# Patient Record
Sex: MALE | Race: BLACK OR AFRICAN AMERICAN | NOT HISPANIC OR LATINO | URBAN - METROPOLITAN AREA
[De-identification: names, ages, dates, MRNs, and addresses within clinical notes are randomized per-mention and may not be internally consistent; named-entity substitution may affect disease eponyms.]

---

## 2017-01-12 ENCOUNTER — HOSPITAL ENCOUNTER (INPATIENT)
Facility: HOSPITAL | Age: 27
LOS: 8 days | Discharge: HOME | DRG: 885 | End: 2017-01-20
Attending: PSYCHIATRY & NEUROLOGY | Admitting: PSYCHIATRY & NEUROLOGY
Payer: MEDICAID

## 2017-01-12 DIAGNOSIS — F25.0 SCHIZOAFFECTIVE DISORDER, BIPOLAR TYPE: ICD-10-CM

## 2017-01-12 DIAGNOSIS — F29 PSYCHOSIS: ICD-10-CM

## 2017-01-12 DIAGNOSIS — F29 PSYCHOSIS, UNSPECIFIED PSYCHOSIS TYPE: Primary | ICD-10-CM

## 2017-01-12 PROCEDURE — 11400000 HC PSYCH PRIVATE ROOM

## 2017-01-12 PROCEDURE — 27000339 *HC DAILY SUPPLY KIT

## 2017-01-12 RX ORDER — ACETAMINOPHEN 325 MG/1
650 TABLET ORAL EVERY 6 HOURS PRN
Status: DISCONTINUED | OUTPATIENT
Start: 2017-01-12 | End: 2017-01-20 | Stop reason: HOSPADM

## 2017-01-12 RX ORDER — OLANZAPINE 10 MG/1
10 TABLET ORAL EVERY 4 HOURS PRN
Status: DISCONTINUED | OUTPATIENT
Start: 2017-01-12 | End: 2017-01-20 | Stop reason: HOSPADM

## 2017-01-12 RX ORDER — DOCUSATE SODIUM 100 MG/1
100 CAPSULE, LIQUID FILLED ORAL DAILY PRN
Status: DISCONTINUED | OUTPATIENT
Start: 2017-01-12 | End: 2017-01-20 | Stop reason: HOSPADM

## 2017-01-12 RX ORDER — IBUPROFEN 200 MG
1 TABLET ORAL DAILY PRN
Status: DISCONTINUED | OUTPATIENT
Start: 2017-01-12 | End: 2017-01-20 | Stop reason: HOSPADM

## 2017-01-12 RX ORDER — LOPERAMIDE HYDROCHLORIDE 2 MG/1
2 CAPSULE ORAL
Status: DISCONTINUED | OUTPATIENT
Start: 2017-01-12 | End: 2017-01-20 | Stop reason: HOSPADM

## 2017-01-12 RX ORDER — HYDROXYZINE PAMOATE 50 MG/1
50 CAPSULE ORAL EVERY 6 HOURS PRN
Status: DISCONTINUED | OUTPATIENT
Start: 2017-01-12 | End: 2017-01-20 | Stop reason: HOSPADM

## 2017-01-12 RX ORDER — MAG HYDROX/ALUMINUM HYD/SIMETH 200-200-20
30 SUSPENSION, ORAL (FINAL DOSE FORM) ORAL EVERY 6 HOURS PRN
Status: DISCONTINUED | OUTPATIENT
Start: 2017-01-12 | End: 2017-01-20 | Stop reason: HOSPADM

## 2017-01-12 RX ORDER — OLANZAPINE 10 MG/2ML
10 INJECTION, POWDER, FOR SOLUTION INTRAMUSCULAR EVERY 4 HOURS PRN
Status: DISCONTINUED | OUTPATIENT
Start: 2017-01-12 | End: 2017-01-20 | Stop reason: HOSPADM

## 2017-01-12 NOTE — PLAN OF CARE
Problem: Overarching Goals (Adult)  Goal: Develops/Participates in Therapeutic [place] to Support Successful Transition  Outcome: Ongoing (interventions implemented as appropriate)  Group Note     Behavior:  Patient attended Psychotherapy Group and participated. Patient presents with calmer more pleasant mood.      Intervention:  How do you Feel About Yourself?  Patients completed assesement of their level of self esteem. Processed questions with group and discussed positive steps to improve.     Response:  Patient unable to stay for group or participate well as he was falling asleep. Patient eventually returned to his room.        Plan:  Will continue to encourage patient participation in group. Will meet 1:1 with patient later.

## 2017-01-12 NOTE — IP AVS SNAPSHOT
[address] [address]  Phone: [phone]           Patient Discharge Instructions     Our goal is to set you up for success. This packet includes information on your condition, medications, and your home care. It will help you to care for yourself so you don't get sicker and need to go back to the hospital.     Please ask your nurse if you have any questions.        There are many details to remember when preparing to leave the hospital. Here is what you will need to do:    1. Take your medicine. If you are prescribed medications, review your Medication List in the following pages. You may have new medications to  at the pharmacy and others that you'll need to stop taking. Review the instructions for how and when to take your medications. Talk with your doctor or nurses if you are unsure of what to do.     2. Go to your follow-up appointments. Specific follow-up information is listed in the following pages. Your may be contacted by a transition nurse or clinical provider about future appointments. Be sure we have all of the phone numbers to reach you, if needed. Please contact your provider's office if you are unable to make an appointment.     3. Watch for warning signs. Your doctor or nurse will give you detailed warning signs to watch for and when to call for assistance. These instructions may also include educational information about your condition. If you experience any of warning signs to your health, call your doctor.               [facility]  Unless otherwise directed by your provider, please contact [facility], our nurse care line that is available for 24/7 assistance.     [phone] (toll-free)    Registered nurses in the [facility] provide clinical advisement, health education, appointment booking, and other advisory services.                    ** Verify the list of medication(s) below is accurate and up to date. Carry  this with you in case of emergency. If your medications have changed, please notify your healthcare provider.             Medication List      START taking these medications        Additional Info                      risperidone 3 MG Tab   Commonly known as:  RISPERDAL   Quantity:  60 tablet   Refills:  1   Dose:  3 mg    Last time this was given:  3 mg on 1/20/2017  8:02 AM   Instructions:  Take 1 tablet (3 mg total) by mouth 2 (two) times daily.     Begin Date    AM    Noon    PM    Bedtime            Where to Get Your Medications      You can get these medications from any pharmacy     Bring a paper prescription for each of these medications     risperidone 3 MG Tab                  Please bring to all follow up appointments:    1. A copy of your discharge instructions.  2. All medicines you are currently taking in their original bottles.  3. Identification and insurance card.    Please arrive 15 minutes ahead of scheduled appointment time.    Please call 24 hours in advance if you must reschedule your appointment and/or time.        Follow-up Information     Follow up with [facility]. Go on 1/26/2017.    Specialties:  Behavioral Health, Psychiatry, Psychology    Why:  Outpatient Psych Services, as scheduled at     Contact information:    [address] [place] [address] [phone]          Schedule an appointment as soon as possible for a visit with [facility].    Why:  [employer]    Contact information:    [address] [place] [address] [phone]          Discharge Instructions       Patient educated on medication changes and discharge plan with patient verbalized understanding. Patient agrees to comply to discharge plan. Patient encouraged to follow up with primary care provider. No signs of distress or complaints. Consent obtain to fax information for follow-up visit.      Discharge References/Attachments      "SCHIZO-AFFECTIVE DISORDER (ENGLISH)    BIPOLAR DISORDER (ENGLISH)        Admission Information     Date & Time Provider Department CSN    1/12/2017  1:10 PM Mendez Cabrales MD [facility] [id number]       Admisson Diagnosis: Psychosis      Care Providers     Provider Role Specialty Primary office phone    Mendez Cabrales MD Attending Provider Psychiatry [phone]      Your Vitals Were     BP Pulse Temp Resp Height Weight    130/61 (BP Location: Right arm, Patient Position: Sitting, BP Method: Automatic) 93 98 °F (36.7 °C) (Oral) 18 5' 11" (1.803 m) 66.2 kg (146 lb)    BMI                20.36 kg/m2          Recent Lab Values     No lab values to display.      Blood Glucose and Lipid Panel Labs     No lab values to display.      Allergies as of 1/20/2017     No Known Allergies      Advance Directives     An advance directive is a document which, in the event you are no longer able to make decisions for yourself, tells your healthcare team what kind of treatment you do or do not want to receive, or who you would like to make those decisions for you.  If you do not currently have an advance directive, [facility] encourages you to create one.  For more information call:  [phone]), [phone] ([phone]),  or log on to [email or web address].        Advance Directive Status          Most Recent Value    Advance Directive Status  Patient does not have Advance Directive, declines information.      Smoking Cessation     If you would like to quit smoking:   You may be eligible for free services if you are a [place] resident and started smoking cigarettes before September 1, 1988.  Call the Smoking Cessation Trust (SCT) toll free at (134) 376-5054 or (262) 722-7917.   Call 9-800-QUIT-NOW if you do not meet the above criteria.            Language Assistance Services     ATTENTION: Language assistance services are available, free of charge. Please call [phone].  "     ATENCIÓN: Si habla chelo, tiene a faustin disposición servicios gratuitos de asistencia lingüística. Juan Ramon al [phone].     [facility] Ý: N?u b?n nói Ti?ng Vi?t, có các d?ch v? h? tr? ngôn ng? mi?n phí dành cho b?n. G?i s? [phone].        National Suicide Prevention Lifeline     If you or someone you know is thinking about suicide, call the National Suicide Prevention Lifeline.  [place] Suicide Hotline: 7-817-441-TALK (6452)  The lifeline is free, confidential and always available.  Help a loved one, a friend or yourself.  www.suicideprevenetionlifeline.org          [facility] Sign-Up     Activating your [facility] account is as easy as 1-2-3!     1) Visit [email or web address], select Sign Up Now, enter this activation code and your date of birth, then select Next.  [email or web address]  Expires: 3/5/2017  6:20 PM      2) Create a username and password to use when you visit [facility] in the future and select a security question in case you lose your password and select Next.    3) Enter your e-mail address and click Sign Up!    Additional Information  If you have questions, please e-mail [email or web address] or call [phone] to talk to our [facility] staff. Remember, [facility] is NOT to be used for urgent needs. For medical emergencies, dial 911.          [facility] complies with applicable Federal civil rights laws and does not discriminate on the basis of race, color, national origin, age, disability, or sex.

## 2017-01-12 NOTE — CONSULTS
IP consult to dietary  Consult performed by: SABRINA MEJIA  Consult ordered by: JOHNATHAN OGDEN  Assessment/Recommendations: BELLA Mejia Registered Dietitian Signed Nutrition Progress Notes         Hide copied text  Hover for attribution information      01/12/17 0389  Reason for Assessment  Reason for Assessment physician consult  Diagnosis (physcosis)  Relevent Medical History psych  Level of Care Low/Medium: 1-2  Nutrition Risk Screen  Nutrition Risk Screen no indicators present  Nutrition/Diet History  Meal/Snack Patterns pt will receive 3 meals daily and snacks on unit  Use of Vitamin/Mineral/Herbal Supplements pt on MVI while on unit  Functional Status ambulatory  Food Allergies (none)  Factors Affecting Nutritional Intake behavioral (mealtime)  Nutrition Support Formula Prior to Admit (n/a)  Anthropometrics  Height (cm) 180.3 cm  Height (inches) 70.98 in  Weight (kg) 65.3 kg  Weight (lb) 143.96 lb  Ideal Body Weight (IBW), Male 171.88 lb  % Ideal Body Weight, Male (lb) 83.76 lb  Adjusted Body Weight 68.5 kg (151 lb)  BMI (kg/m2) 20.09  BMI Grade 18.5-24.9 - normal  Labs/Tests/Procedures/Meds  Pertinent Labs Reviewed reviewed  Pertinent Labs Comments no labs available yet  Pertinent Medications Reviewed reviewed  Physical Findings/Assessment  Tubes (no)  Oral/Mouth Cavity (pt eating 1-00%)  Estimated/Assessed Needs  Weight Used For Calorie Calculations 68.5 kg (151 lb)  Energy Calorie Reqirements (kcal) 2000  Energy Need Method indirect calorimetry  Indirect Calorimetry x 30  30 kcal/kg (kcal) 2054.79  Protein Requirements 68 gm  Weight Used For Protein Calculations 68.5 kg (151 lb)  1.0 gm Protein (gm) 68.64  RDA Method (mL) 2000  Nutrition Prescription Ordered  Current Diet Order general  Nutrition Order Comments adequate  Current Nutrition Support Formula Ordered (n/a)  Nutrition Risk  Level of Risk low  Continuum of Care Plan  Referral to Outpatient Services behavioral health clinic  Malnutrition  (Undernutrition) Diagnosis  % Intake of Estimated Energy Needs 75 - 100%  % Meal Intake 100%  Malnutrition Reason illness related  Nutrition Diagnosis  Nutrition Problem Underweight  Etiology/Related To see dx  Nutrition Diagnosis Signs/Symptoms As Evidenced By see BMI  Nutrition Diagnosis Status Continues  Additional Nutrition Diagnosis? no  Recommendations  Recommendation/Intervention 2 Boosts daily  Goals to maintain or increase body wt  Nutrition Goal Status progressing towards goal  Communication of RD Recs (refer to notes)  Monitor and Evaluation  Food and Nutrient Intake food and beverage intake  Anthropometric Measurements weight change  Nutrition Follow-up  RD Follow-up? Yes  Next Date to be Seen by RD 01/16/17

## 2017-01-12 NOTE — PLAN OF CARE
Problem: Patient Care Overview (Adult)  Goal: Plan of Care Review  Outcome: Ongoing (interventions implemented as appropriate)  Admit note : nursing assessment done . Patient admits to the delusions given in report . He is cooperative and asks for understanding from us . States that he is a lovable person . Says he is unable to sleep because of all of his unpleasant thoughts . Unit tour done . He is cooperative .

## 2017-01-12 NOTE — PROGRESS NOTES
01/12/17 1554   Reason for Assessment   Reason for Assessment physician consult   Diagnosis (physcosis)   Relevent Medical History psych   Level of Care Low/Medium: 1-2   Nutrition Risk Screen   Nutrition Risk Screen no indicators present   Nutrition/Diet History   Meal/Snack Patterns pt will receive 3 meals daily and snacks on unit   Use of Vitamin/Mineral/Herbal Supplements pt on MVI while on unit   Functional Status ambulatory   Food Allergies (none)   Factors Affecting Nutritional Intake behavioral (mealtime)   Nutrition Support Formula Prior to Admit (n/a)   Anthropometrics   Height (cm) 180.3 cm   Height (inches) 70.98 in   Weight (kg) 65.3 kg   Weight (lb) 143.96 lb   Ideal Body Weight (IBW), Male 171.88 lb   % Ideal Body Weight, Male (lb) 83.76 lb   Adjusted Body Weight 68.5 kg (151 lb)   BMI (kg/m2) 20.09   BMI Grade 18.5-24.9 - normal   Labs/Tests/Procedures/Meds   Pertinent Labs Reviewed reviewed   Pertinent Labs Comments no labs available yet   Pertinent Medications Reviewed reviewed   Physical Findings/Assessment   Tubes (no)   Oral/Mouth Cavity (pt eating 1-00%)   Estimated/Assessed Needs   Weight Used For Calorie Calculations 68.5 kg (151 lb)   Energy Calorie Reqirements (kcal) 2000   Energy Need Method indirect calorimetry   Indirect Calorimetry x 30   30 kcal/kg (kcal) 2054.79   Protein Requirements 68 gm   Weight Used For Protein Calculations 68.5 kg (151 lb)   1.0 gm Protein (gm) 68.64   RDA Method (mL) 2000   Nutrition Prescription Ordered   Current Diet Order general   Nutrition Order Comments adequate   Current Nutrition Support Formula Ordered (n/a)   Nutrition Risk   Level of Risk low   Continuum of Care Plan   Referral to Outpatient Services behavioral health clinic   Malnutrition (Undernutrition) Diagnosis   % Intake of Estimated Energy Needs 75 - 100%   % Meal Intake 100%   Malnutrition Reason illness related   Nutrition Diagnosis   Nutrition Problem Underweight   Etiology/Related To see  dx   Nutrition Diagnosis Signs/Symptoms As Evidenced By see BMI   Nutrition Diagnosis Status Continues   Additional Nutrition Diagnosis? no   Recommendations   Recommendation/Intervention 2 Boosts daily   Goals to maintain or increase body wt   Nutrition Goal Status progressing towards goal   Communication of RD Recs (refer to notes)   Monitor and Evaluation   Food and Nutrient Intake food and beverage intake   Anthropometric Measurements weight change   Nutrition Follow-up   RD Follow-up? Yes   Next Date to be Seen by RD 01/16/17

## 2017-01-12 NOTE — PLAN OF CARE
Problem: Patient Care Overview (Adult)  Goal: Plan of Care Review  Outcome: Ongoing (interventions implemented as appropriate)  Recommendations  Recommendation/Intervention 2 Boosts daily  Goals to maintain or increase body wt  Nutrition Goal Status progressing towards goal  Communication of RD Recs (refer to notes)
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
## 2017-01-13 PROBLEM — F25.0 SCHIZOAFFECTIVE DISORDER, BIPOLAR TYPE: Status: ACTIVE | Noted: 2017-01-12

## 2017-01-13 PROCEDURE — 90833 PSYTX W PT W E/M 30 MIN: CPT | Mod: AF,S$PBB,, | Performed by: PSYCHIATRY & NEUROLOGY

## 2017-01-13 PROCEDURE — 11400000 HC PSYCH PRIVATE ROOM

## 2017-01-13 PROCEDURE — 27000339 *HC DAILY SUPPLY KIT

## 2017-01-13 PROCEDURE — 99223 1ST HOSP IP/OBS HIGH 75: CPT | Mod: AF,S$PBB,, | Performed by: PSYCHIATRY & NEUROLOGY

## 2017-01-13 PROCEDURE — 25000003 PHARM REV CODE 250: Performed by: PSYCHIATRY & NEUROLOGY

## 2017-01-13 PROCEDURE — 99223 1ST HOSP IP/OBS HIGH 75: CPT | Mod: ,,, | Performed by: NURSE PRACTITIONER

## 2017-01-13 RX ORDER — RISPERIDONE 0.5 MG/1
1 TABLET ORAL 2 TIMES DAILY
Status: DISCONTINUED | OUTPATIENT
Start: 2017-01-13 | End: 2017-01-14

## 2017-01-13 RX ADMIN — RISPERIDONE 1 MG: 0.5 TABLET, FILM COATED ORAL at 08:01

## 2017-01-13 RX ADMIN — RISPERIDONE 1 MG: 0.5 TABLET, FILM COATED ORAL at 11:01

## 2017-01-13 RX ADMIN — THERA TABS 1 TABLET: TAB at 08:01
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
## 2017-01-13 NOTE — PROGRESS NOTES
"Therapeutic Recreation Assessment Summary: Met with the patient to assess and develop a plan of care. Patient was talkative, speech elevated and has disorganized thought process. Patient states his mood was "ok." Patient states his reason for admit "I told my mom I'd take my medication later. My mom called the  on me." Patient states he feels "Overwhelmed by certain systems of stress in my lifetime." He states " I kind of over visualize sometimes. I imagine I see people I hadn't seen in a while." Patient verbalize enjoyment from playing basketball, telling jokes, writing poems and drawing. Patient admits to negative leisure lifestyle of marijuana use, smoking cigarettes and drinking alcohol occasionally. Patient identifies leisure barriers due to a lack of finances and unemployment. Patient reports that he is single, has some technical college and lives with his mother in [place]. Patient identified main goal "I want to be able to know when to ask a person a question, when to communicate and they not get upset." Patient verbalizes willingness to participate and become active in his treatment.  "
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
## 2017-01-13 NOTE — PLAN OF CARE
"Problem: Patient Care Overview (Adult)  Goal: Plan of Care Review  Outcome: Ongoing (interventions implemented as appropriate)  Patient cooperative. Grandiose. Rambling speech. Disorganized thoughts. Delusions about roaches. Medication compliant. Accepted breakfast, refusing lunch stating "I just want a lemon, that'll hold me over till I leave here next week". Accepting snacks.      "

## 2017-01-13 NOTE — SUBJECTIVE & OBJECTIVE
Past Medical History   Diagnosis Date    Anxiety     Depression     History of psychiatric hospitalization     Hx of psychiatric care     Psychiatric problem     Psychosis     Schizoaffective disorder     Therapy        No past surgical history on file.    Review of patient's allergies indicates:  No Known Allergies    No current facility-administered medications on file prior to encounter.      No current outpatient prescriptions on file prior to encounter.     Family History     None        Social History Main Topics    Smoking status: Heavy Tobacco Smoker     Packs/day: 1.00     Years: 0.50     Types: Cigarettes    Smokeless tobacco: Not on file    Alcohol use 1.2 oz/week     2 Cans of beer per week    Drug use: Yes     Special: Marijuana    Sexual activity: Not Currently     Partners: Female     Birth control/ protection: None     Review of Systems   Constitutional: Negative for chills and fever.   Respiratory: Negative for chest tightness and shortness of breath.    Cardiovascular: Negative for chest pain and palpitations.   Gastrointestinal: Negative for abdominal distention, abdominal pain, blood in stool and vomiting.   Genitourinary: Negative for dysuria, flank pain, hematuria and urgency.   Musculoskeletal: Negative for back pain and neck pain.   Skin: Negative for rash and wound.   Neurological: Negative for dizziness, weakness and numbness.   Hematological: Does not bruise/bleed easily.   Psychiatric/Behavioral: Positive for self-injury. Negative for agitation and suicidal ideas. The patient is not nervous/anxious.      Objective:     Vital Signs (Most Recent):  Temp: 97 °F (36.1 °C) (01/13/17 0730)  Pulse: (!) 57 (01/13/17 0730)  Resp: 16 (01/13/17 0730)  BP: 119/67 (01/13/17 0730) Vital Signs (24h Range):  Temp:  [96.7 °F (35.9 °C)-97.9 °F (36.6 °C)] 97 °F (36.1 °C)  Pulse:  [51-78] 57  Resp:  [16-18] 16  BP: (119-129)/(67-75) 119/67     Weight: 65.3 kg (144 lb)  Body mass index is 20.08  kg/(m^2).    Physical Exam   Constitutional: He is oriented to person, place, and time. He appears well-developed and well-nourished.   HENT:   Head: Normocephalic and atraumatic.   Neck: Normal range of motion. Neck supple. No thyromegaly present.   Cardiovascular: Normal rate, regular rhythm, normal heart sounds and intact distal pulses.    No murmur heard.  Pulmonary/Chest: Effort normal and breath sounds normal. No respiratory distress. He has no wheezes. He has no rales.   Abdominal: Soft. Bowel sounds are normal. He exhibits no distension. There is no tenderness.   Musculoskeletal: Normal range of motion. He exhibits no edema or deformity.   Neurological: He is alert and oriented to person, place, and time.   Neuro: Cranial nerves:  CN II Visual fields full to confrontation.   CN III, IV, VI Pupils are equal, round, and reactive to light.  CN III: no palsy  Nystagmus: none   Diplopia: none  Ophthalmoparesis: none  CN V Facial sensation intact.   CN VII Facial expression full, symmetric.   CN VIII normal.   CN IX normal.   CN X normal.   CN XI normal.   CN XII normal.     Skin: Skin is warm and dry.   Psychiatric: He has a normal mood and affect. His behavior is normal. Thought content normal.   Nursing note and vitals reviewed.      Significant Labs: All pertinent labs within the past 24 hours have been reviewed.
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
## 2017-01-13 NOTE — H&P
"PSYCHIATRY INPATIENT ADMISSION NOTE - H & P      1/13/2017 9:29 AM   Brian Aaron   1990   [id number]           DATE OF ADMISSION: 1/12/2017  1:10 PM    SITE: [facility]    CURRENT LEGAL STATUS: PEC and/or CEC      HISTORY    CHIEF COMPLAINT   Brian Aaron is a 26 y.o. male with a past psychiatric history of psychosis, currently admitted to the inpatient unit with the following chief complaint: psychosis    HPI   (Elements: Location, Quality, Severity, Duration, Timing, Content, Modifying Factors, Associated Signs & Symptoms)    The patient was seen and examined. The chart was reviewed.    The patient presented to the ER on 1/12/17 under an OPC with complaints of bizarre behavior. The patient was medically cleared and admitted to the U.     The patient has been experiencing AVH (chronic condition) with recent worsening of symptoms (unknowntime frame). He stated that he has been having thoughts of hurting things that hurt him like dogs, spiders and roaches. The roaches ars 4-5 feet long and people put their spirits in them; the wires outside give them electrical energy and they try to attack him . He tells of a dream about a dog on the couch, and "I thought he was dying so I hit him and he got up ,burped, ,farted, and jumped off the couch." He has recently been non-compliant with his medication and abuses cannabis.      He has significant and impairing s/s of psychosis with bizarre delusions, AVH, disorganized thought process and negative symptoms.     He was a poor historian and the history provided below was limited.    Denied Symptoms of Depression: no diminished mood or loss of interest/anhedonia; no irritability, diminished energy, change in sleep, change in appetite, diminished concentration or cognition or indecisiveness, PMA/R, excessive guilt or hopelessness or worthlessness, or suicidal ideations    +Changes in Sleep: +trouble with initiation/maintenance, no early morning awakening with " inability to return to sleep, no hypersomnolence    Denied Suicidal/Homicidal ideations: no active/passive ideations, organized plans,or future intentions    +Symptoms of psychosis: +hallucinations, +delusions, +disorganized thinking, no disorganized behavior or abnormal motor behavior, and +negative symptoms (+diminshed emotional expression, no avolition, no anhedonia, no alogia, no asociality)     +Symptoms of herrera or hypomania: no elevated, +expansive, no  irritable mood with +increased energy or activity; with +inflated self-esteem or grandiosity, +decreased need for sleep, +increased rate of speech, +FOI or racing thoughts, +distractibility, increased goal directed activity or PMA, no risky/disinhibited behavior    Denied Symptoms of TERESITA: no excessive anxiety/worry/fear; with no restlessness, fatigue, poor concentration, irritability, muscle tension, or sleep disturbance    Denied Symptoms of Panic Disorder: no recurrent panic attacks; without agoraphobia    Denied Symptoms of PTSD: no h/o trauma; no re-experiencing/intrusive symptoms, avoidant behavior, negative alterations in cognition or mood, or hyperarousal symptoms; without dissociative symptoms     Denied Symptoms of OCD: denied obsessions or compulsions     Denied Symptoms of Eating Disorders: no anorexia, bulimia or binging    +Substance Use: +intoxication, no withdrawal, no tolerance, no used in larger amounts or duration than intended, no unsuccessful attempts to limit or quit, no increased time engaging in or seeking out, +cravings or strong desire to use, no failure to fulfill obligations, no negative consequences in social/interpersonal/occupational,/recreational areas, no use in dangerous situations, +medical or psychological consequences       PSYCHOTHERAPY ADD-ON +[id number]   30 (16-37*) minutes    Time: 16 minutes  Participants: Met with patient    Therapeutic Intervention Type: behavior modifying psychotherapy, supportive psychotherapy  Why  "chosen therapy is appropriate versus another modality: relevant to diagnosis, patient responds to this modality, evidence based practice    Target symptoms: mood disorder, psychosis  Primary focus: psychosis  Psychotherapeutic techniques: supportive techniques; psycho-education    Outcome monitoring methods: self-report, observation    Patient's response to intervention:  The patient's response to intervention is accepting.    Progress toward goals:  The patient's progress toward goals is limited.            PAST PSYCHIATRIC HISTORY  Previous Psychiatric Hospitalizations: multiple admission in psychiatry units; 3 previous admits; first was at age [age]; second was in 12/2016.   Previous SI/HI: SI once; no HI  Previous Suicide Attempts: denied   Previous Medication Trials: Geodon; Risperdal and many others, "but they hurt me."   Psychiatric Care (current & past): diagnosed with schizophrenia.  History of Psychotherapy: as per schizophrenic dx  History of Violence: possible        SUBSTANCE ABUSE HISTORY   Tobacco: 0.25-1 ppd x 9 years  Alcohol: wine infrequently;   Illicit Substances: patient used ecstasy in the past; cannabis weekly to Monthly  Misuse of Prescription Medications: history of sleep medication abuse.  Detoxes: none  Rehabs: none  12 Step Meetings: none  Periods of Sobriety: none  Withdrawal: none        PAST MEDICAL & SURGICAL HISTORY   Past Medical History   Diagnosis Date    Anxiety     Depression     History of psychiatric hospitalization     Hx of psychiatric care     Psychiatric problem     Psychosis     Schizoaffective disorder     Therapy      No past surgical history on file.      CURRENT MEDICATION REGIMEN   Home Meds:   Prior to Admission medications    Not on File         OTC Meds: none    Scheduled Meds:    multivitamin  1 tablet Oral Daily      PRN Meds: acetaminophen, aluminum-magnesium hydroxide-simethicone, docusate sodium, hydrOXYzine pamoate, loperamide, nicotine, olanzapine " "**AND** olanzapine   Psychotherapeutics     Start     Stop Route Frequency Ordered    01/12/17 1634  olanzapine tablet 10 mg  (Olanzapine)      -- Oral Every 4 hours PRN 01/12/17 1535    01/12/17 1634  olanzapine injection 10 mg  (Olanzapine)      -- IM Every 4 hours PRN 01/12/17 1535            ALLERGIES   Review of patient's allergies indicates:  No Known Allergies      NEUROLOGIC HISTORY  Seizures: denied   Head trauma: denied       FAMILY PSYCHIATRIC HISTORY   History reviewed. No pertinent family history.           SOCIAL HISTORY  Developmental/Childhood: met milestones  History of Physical/Sexual Abuse: denied, "it was more like mental abuse"  Education: some college    Employment: unemployed   Financial: supported by family   Relationship Status/Sexual Orientation: single, never    Children: 1 son (not in the patient's life)  Housing Status: lives with his mother    Advent: Anabaptist, Christianity   History: denied   Recreational Activities: basketball, video games  Access to Gun: denied       LEGAL HISTORY   Past Charges/Incarcerations: 8/2001 and a few other incarcerations-  "false accusations"   Pending Charges: denied      ROS  Reviewed note/exam by ER physician in paper chart; FM consult pending         EXAMINATION      PHYSICAL EXAM  Reviewed note/exam by ER physician in paper chart; FM consult pending     VITALS   Vitals:    01/13/17 0730   BP: 119/67   Pulse: (!) 57   Resp: 16   Temp: 97 °F (36.1 °C)          PAIN  0/10  Subjective report of pain matches objective signs and symptoms: Yes      LABORATORY DATA   No results found for this or any previous visit (from the past 72 hour(s)).   No results found for: PHENYTOIN, PHENOBARB, VALPROATE, CBMZ        CONSTITUTIONAL  General Appearance: AAM, in casual attie; NAD    MUSCULOSKELETAL  Muscle Strength and Tone:  normal  Abnormal Involuntary Movements:  none  Gait and Station:  normal; non-ataxic    PSYCHIATRIC   Level of Consciousness: awake, " "alert  Orientation: p/p/t/s  Grooming: fair- adequate to circumstances  Psychomotor Behavior: no PMA/R  Speech: nl t/v, rambling, increased r/s  Language:  English fluent  Mood: "ok"  Affect:  Euthymic to elevated  Thought Process: derailed and diorganized  Associations: + JOAN  Thought Content: +AVH/delusions; denied HI/SI  Memory:  intact to recent and remote events  Attention:  +distractible   Fund of Knowledge:  age and education appropriate  Estimate if Intelligence:  average based on work/education history, vocabulary and mental status exam  Insight: impaired- seeks help; unable to recognize illness  Judgment:  good- no bx issues, compliant and cooperative        PSYCHOSOCIAL      PSYCHOSOCIAL STRESSORS   family and financial    FUNCTIONING RELATIONSHIPS   good support system      STRENGTHS AND LIABILITIES   Strength: Patient accepts guidance/feedback, Strength: Patient is physically healthy., Liability: Patient is unstable., Liability: Patient lacks coping skills.      Is the patient aware of the biomedical complications associated with substance abuse and mental illness? yes    Does the patient have an Advance Directive for Mental Health treatment? no  (If yes, inform patient to bring copy.)        ASSESSMENT     IMPRESSION   Schizoaffective disorder, bipolar type mre manic  Cannabis Abuse  Nicotine Dependence           MEDICAL DECISION MAKING        PROBLEM LIST AND MANAGEMENT PLANS    Psychosis  Summer  Cannabis use  Nicotine use      PRESCRIPTION DRUG MANAGEMENT  Compliance: yes  Side Effects: no  Regimen Adjustments:   Start Risperdal 1 mg po BID for psychosis and summer; shira switch to FORTE if able    Consider starting Depakote for adjunctive mood stabilization     Counseled on cannabis and nicotine use- patient is not interested in quitting at this time; he does not want pharmacotherapy       DIAGNOSTIC TESTING  Labs reviewed; follow up pending labs    Disposition:  -SW to assist with aftercare planning and " collateral  -Once stable discharge home with outpatient follow up care and/or rehab  -Continue inpatient treatment under a PEC and/or CEC for grave disability as evident by psychosis with impaired thought process and ADLs.       Mendez Cabrales MD  Psychiatry

## 2017-01-13 NOTE — PLAN OF CARE
Problem: Patient Care Overview (Adult)  Goal: Plan of Care Review  Outcome: Ongoing (interventions implemented as appropriate)  Lying quiet in bed ,eyes closed, respiration even and unlabored, appears asleep.  Slept some of the shift but was up to bathroom once and the other time was just awake. Pt did not request sleep med or had any complaints.  Safety maintained with rounds every 15 minutes, bed is fixed in a low position and pathways kept clear.  No fall occurred.

## 2017-01-13 NOTE — PLAN OF CARE
Problem: Patient Care Overview (Adult)  Goal: Individualization & Mutuality  Out on unit in dayroom.  Sitting quietly watching TV.  Not much interactions with peers or staff.  Once movie was over pt went to room, then bathroom and to bed.  Will continue to monitor.  Safety and precautions maintained, bed low and pathway kept clear.

## 2017-01-14 LAB
BASOPHILS # BLD AUTO: 0.03 K/UL
BASOPHILS NFR BLD: 0.5 %
DIFFERENTIAL METHOD: ABNORMAL
EOSINOPHIL # BLD AUTO: 0.2 K/UL
EOSINOPHIL NFR BLD: 3.5 %
ERYTHROCYTE [DISTWIDTH] IN BLOOD BY AUTOMATED COUNT: 12.7 %
HCT VFR BLD AUTO: 45 %
HGB BLD-MCNC: 16.1 G/DL
LYMPHOCYTES # BLD AUTO: 2.2 K/UL
LYMPHOCYTES NFR BLD: 38.1 %
MCH RBC QN AUTO: 33.3 PG
MCHC RBC AUTO-ENTMCNC: 35.8 %
MCV RBC AUTO: 93 FL
MONOCYTES # BLD AUTO: 0.6 K/UL
MONOCYTES NFR BLD: 10.1 %
NEUTROPHILS # BLD AUTO: 2.8 K/UL
NEUTROPHILS NFR BLD: 47.8 %
PLATELET # BLD AUTO: 218 K/UL
PMV BLD AUTO: 10.2 FL
RBC # BLD AUTO: 4.84 M/UL
WBC # BLD AUTO: 5.75 K/UL

## 2017-01-14 PROCEDURE — 36415 COLL VENOUS BLD VENIPUNCTURE: CPT

## 2017-01-14 PROCEDURE — 27000339 *HC DAILY SUPPLY KIT

## 2017-01-14 PROCEDURE — 99232 SBSQ HOSP IP/OBS MODERATE 35: CPT | Mod: AF,S$PBB,, | Performed by: PSYCHIATRY & NEUROLOGY

## 2017-01-14 PROCEDURE — 25000003 PHARM REV CODE 250: Performed by: PSYCHIATRY & NEUROLOGY

## 2017-01-14 PROCEDURE — 85025 COMPLETE CBC W/AUTO DIFF WBC: CPT

## 2017-01-14 PROCEDURE — 11400000 HC PSYCH PRIVATE ROOM

## 2017-01-14 RX ORDER — RISPERIDONE 0.5 MG/1
1 TABLET ORAL DAILY
Status: DISCONTINUED | OUTPATIENT
Start: 2017-01-15 | End: 2017-01-15

## 2017-01-14 RX ORDER — RISPERIDONE 2 MG/1
2 TABLET ORAL NIGHTLY
Status: DISCONTINUED | OUTPATIENT
Start: 2017-01-14 | End: 2017-01-15

## 2017-01-14 RX ADMIN — RISPERIDONE 2 MG: 2 TABLET ORAL at 08:01

## 2017-01-14 RX ADMIN — THERA TABS 1 TABLET: TAB at 08:01

## 2017-01-14 RX ADMIN — RISPERIDONE 1 MG: 0.5 TABLET, FILM COATED ORAL at 08:01

## 2017-01-14 NOTE — PROGRESS NOTES
"PSYCHIATRY DAILY INPATIENT PROGRESS NOTE  SUBSEQUENT HOSPITAL VISIT    ENCOUNTER DATE: 1/14/2017  SITE: [facility] [place]    DATE OF ADMISSION: 1/12/2017  1:10 PM  LENGTH OF STAY: 2 days      HISTORY    CHIEF COMPLAINT   Brian Aaron is a 26 y.o. male, seen during daily webster rounds on the inpatient unit.  Brian Aaron presents with the chief complaint of psychosis    HPI   (Elements: Location, Quality, Severity, Duration, Timing, Content, Modifying Factors, Associated Signs & Symptoms)    The patient was seen and examined. The chart was reviewed.    Staff reports no behavioral or management issues. Patient slept 4.5 hours last night.  The patient has been compliant with treatment.     On my assessment, patient first observed in prayer when I saw him in his room.  Patient oriented to month and year, but thinks that today is "Tuesday."  When asked about observed prayers, he says he was "focusing in on a thought."  Elaborating, he says he's been focused on solving interpersonal problems and reflecting on some arguments he got into prior to hospital admission.  Patient's delusions of roaches persist, but states he feels like "he's scared them away."  He goes on to make a loosely logical argument that I can't really follow about "working for pest control."  Compares the roaches to the "Men in Black" movie.  Today patient thinks that he's here because of an argument with his mom: "but she's not the one that's been sitting on a couch and being eaten up by roaches and feeling like I'm getting electrocuted."  Again, patient launches into disorganized, +FOI regarding different kinds of roaches.  Denies seeing roaches since coming to the hospital, but remains paranoid about them following him here.     The patient denied any side effects.  Denies problems with medicine "as of today."  Feels like "it's better than the old risperdal."  Patient reports some jaw pain which he thinks may because roaches broke up his wisdom " teeth.    Other psychiatric symptoms reviewed and documented below:  Denied Symptoms of Depression: no diminished mood or loss of interest/anhedonia; no irritability, diminished energy, change in sleep, change in appetite, diminished concentration or cognition or indecisiveness, PMA/R, excessive guilt or hopelessness or worthlessness, or suicidal ideations     +Changes in Sleep: +trouble with initiation/maintenance, no early morning awakening with inability to return to sleep, no hypersomnolence     Denied Suicidal/Homicidal ideations: no active/passive ideations, organized plans,or future intentions     +Symptoms of psychosis: +hallucinations (+AH, +VH), +delusions, +disorganized thinking, no disorganized behavior or abnormal motor behavior, and +negative symptoms (+diminshed emotional expression, no avolition, no anhedonia, no alogia, no asociality)      +Symptoms of herrera or hypomania: no elevated, +expansive, no  irritable mood with +increased energy or activity; with +inflated self-esteem or grandiosity, +decreased need for sleep, +increased rate of speech, +FOI or racing thoughts, +distractibility, increased goal directed activity or PMA, no risky/disinhibited behavior     Denied Symptoms of TERESITA: no excessive anxiety/worry/fear; with no restlessness, fatigue, poor concentration, irritability, muscle tension, or sleep disturbance     Denied Symptoms of Panic Disorder: no recurrent panic attacks; without agoraphobia     Denied Symptoms of PTSD: no h/o trauma; no re-experiencing/intrusive symptoms, avoidant behavior, negative alterations in cognition or mood, or hyperarousal symptoms; without dissociative symptoms      Denied Symptoms of OCD: denied obsessions or compulsions      Denied Symptoms of Eating Disorders: no anorexia, bulimia or binging             ROS  General ROS: negative, +jaw pain  Ophthalmic ROS: negative  ENT ROS: negative  Allergy and Immunology ROS: negative  Hematological and Lymphatic ROS:  "negative  Endocrine ROS: negative  Respiratory ROS: no cough, shortness of breath, or wheezing  Cardiovascular ROS: no chest pain or dyspnea on exertion  Gastrointestinal ROS: no abdominal pain, change in bowel habits, or black or bloody stools  Genito-Urinary ROS: no dysuria, trouble voiding, or hematuria  Musculoskeletal ROS: negative  Neurological ROS: no TIA or stroke symptoms  Dermatological ROS: negative    PAST MEDICAL HISTORY   Past Medical History   Diagnosis Date    Anxiety     Depression     History of psychiatric hospitalization     Hx of psychiatric care     Psychiatric problem     Psychosis     Schizoaffective disorder     Therapy            PSYCHOTROPIC MEDICATIONS   Scheduled Meds:   multivitamin  1 tablet Oral Daily    risperidone  1 mg Oral BID     Continuous Infusions:   PRN Meds:.acetaminophen, aluminum-magnesium hydroxide-simethicone, docusate sodium, hydrOXYzine pamoate, loperamide, nicotine, olanzapine **AND** olanzapine        EXAMINATION    VITALS   Vitals:    01/14/17 0755   BP: 110/76   Pulse: 101   Resp: 18   Temp: 97.6 °F (36.4 °C)       CONSTITUTIONAL  General Appearance: stated age    NEUROLOGIC  Abnormal Involuntary Movements: none  Gait and Station: normal    PSYCHIATRIC   Level of Consciousness: awake, alert  Orientation: person, place, month year  Grooming: fair, did not shower yet today  Psychomotor Behavior: none  Speech: pressured  Mood: "excited"  Affect: somewhat constricted but noted to be enthusiastic when talking about delusions  Thought Process: disorganized, tangential  Thought Content: +paranoid delusions, +AH, +VH   Memory: intact  Attention: distractable  Insight: limited, patient is help seeking and compliant with treatment  Judgment: behavior appropriate for setting        DIAGNOSTIC TESTING   Laboratory Results  Recent Results (from the past 24 hour(s))   CBC auto differential    Collection Time: 01/14/17  8:35 AM   Result Value Ref Range    WBC 5.75 3.90 " - 12.70 K/uL    RBC 4.84 4.60 - 6.20 M/uL    Hemoglobin 16.1 14.0 - 18.0 g/dL    Hematocrit 45.0 40.0 - 54.0 %    MCV 93 82 - 98 fL    MCH 33.3 (H) 27.0 - 31.0 pg    MCHC 35.8 32.0 - 36.0 %    RDW 12.7 11.5 - 14.5 %    Platelets 218 150 - 350 K/uL    MPV 10.2 9.2 - 12.9 fL    Gran # 2.8 1.8 - 7.7 K/uL    Lymph # 2.2 1.0 - 4.8 K/uL    Mono # 0.6 0.3 - 1.0 K/uL    Eos # 0.2 0.0 - 0.5 K/uL    Baso # 0.03 0.00 - 0.20 K/uL    Gran% 47.8 38.0 - 73.0 %    Lymph% 38.1 18.0 - 48.0 %    Mono% 10.1 4.0 - 15.0 %    Eosinophil% 3.5 0.0 - 8.0 %    Basophil% 0.5 0.0 - 1.9 %    Differential Method Automated          MEDICAL DECISION MAKING    DIAGNOSES  Schizoaffective disorder, bipolar type mre manic  Cannabis Abuse  Nicotine Dependence     PROBLEM LIST AND MANAGEMENT PLANS  Psychosis  Summer  Cannabis use  Nicotine use     PRESCRIPTION DRUG MANAGEMENT  Compliance: yes  Side Effects: no  Regimen Adjustments:   Increase Risperdal 1 mg qo qAM + Risperdal 2mg qHS for psychosis and summer; plan to switch to FORTE     Consider starting Depakote for adjunctive mood stabilization      Counseled on cannabis and nicotine use- patient is not interested in quitting at this time; he does not want pharmacotherapy     DISCHARGE PLANNING  Expected Disposition Plan: to home      NEED FOR CONTINUED HOSPITALIZATION  Psychiatric illness continues to pose a potential threat to life or bodily function, of self or others, thereby requiring the need for continued inpatient psychiatric hospitalization: Yes, patient remains delusional, not maintaining ADLs, +gravely disabled    Protective inpatient pyschiatric hospitalization required while a safe disposition plan is enacted: Yes    Patient stabilized and ready for discharge from inpatient psychiatric unit: No      STAFF:   Mickey Brennan MD  Psychiatry

## 2017-01-14 NOTE — PLAN OF CARE
Problem: Patient Care Overview (Adult)  Goal: Plan of Care Review  Outcome: Ongoing (interventions implemented as appropriate)  Pt out on unit off and on.Hygiene and grooming poor.Appitite adequate.Pt reports he is here due to an argument with his mom mainly over him not taking his medications and roaches.Pt said he was afraid to take all his medications because he was concerned it would increase hid blood pressure.Pt reports last night he felt something hit him in the back of his head like a ice pick.Pt rambling on and said some mad  may be controlling the roaches.Medication compliant.

## 2017-01-14 NOTE — PLAN OF CARE
Problem: Patient Care Overview (Adult)  Goal: Plan of Care Review  Pt has slept 3.5 hours with one wakeful episode.  Pt is sleeping at the present time.  NAD.  Resp even & unlabored.  Pathways clear and bed is low.  Q 15 minute safety checks ongoing.  All precautions maintained.

## 2017-01-15 LAB
BASOPHILS # BLD AUTO: 0.04 K/UL
BASOPHILS NFR BLD: 0.7 %
DIFFERENTIAL METHOD: ABNORMAL
EOSINOPHIL # BLD AUTO: 0.2 K/UL
EOSINOPHIL NFR BLD: 4.3 %
ERYTHROCYTE [DISTWIDTH] IN BLOOD BY AUTOMATED COUNT: 12.6 %
HCT VFR BLD AUTO: 45.1 %
HGB BLD-MCNC: 15.9 G/DL
LYMPHOCYTES # BLD AUTO: 2.4 K/UL
LYMPHOCYTES NFR BLD: 43.5 %
MCH RBC QN AUTO: 32.6 PG
MCHC RBC AUTO-ENTMCNC: 35.3 %
MCV RBC AUTO: 93 FL
MONOCYTES # BLD AUTO: 0.6 K/UL
MONOCYTES NFR BLD: 10.1 %
NEUTROPHILS # BLD AUTO: 2.3 K/UL
NEUTROPHILS NFR BLD: 41.4 %
PLATELET # BLD AUTO: 222 K/UL
PMV BLD AUTO: 10.4 FL
RBC # BLD AUTO: 4.87 M/UL
WBC # BLD AUTO: 5.56 K/UL

## 2017-01-15 PROCEDURE — 25000003 PHARM REV CODE 250: Performed by: PSYCHIATRY & NEUROLOGY

## 2017-01-15 PROCEDURE — 36415 COLL VENOUS BLD VENIPUNCTURE: CPT

## 2017-01-15 PROCEDURE — 99232 SBSQ HOSP IP/OBS MODERATE 35: CPT | Mod: AF,S$PBB,, | Performed by: PSYCHIATRY & NEUROLOGY

## 2017-01-15 PROCEDURE — 11400000 HC PSYCH PRIVATE ROOM

## 2017-01-15 PROCEDURE — 27000339 *HC DAILY SUPPLY KIT

## 2017-01-15 PROCEDURE — 85025 COMPLETE CBC W/AUTO DIFF WBC: CPT

## 2017-01-15 RX ORDER — RISPERIDONE 0.5 MG/1
1 TABLET ORAL ONCE
Status: COMPLETED | OUTPATIENT
Start: 2017-01-15 | End: 2017-01-15

## 2017-01-15 RX ORDER — RISPERIDONE 2 MG/1
2 TABLET ORAL 2 TIMES DAILY
Status: DISCONTINUED | OUTPATIENT
Start: 2017-01-15 | End: 2017-01-16

## 2017-01-15 RX ADMIN — RISPERIDONE 2 MG: 2 TABLET ORAL at 08:01

## 2017-01-15 RX ADMIN — RISPERIDONE 1 MG: 0.5 TABLET, FILM COATED ORAL at 11:01

## 2017-01-15 RX ADMIN — RISPERIDONE 1 MG: 0.5 TABLET, FILM COATED ORAL at 08:01

## 2017-01-15 RX ADMIN — THERA TABS 1 TABLET: TAB at 08:01

## 2017-01-15 NOTE — PROGRESS NOTES
"PSYCHIATRY DAILY INPATIENT PROGRESS NOTE  SUBSEQUENT HOSPITAL VISIT    ENCOUNTER DATE: 1/15/2017  SITE: [facility] [place]    DATE OF ADMISSION: 1/12/2017  1:10 PM  LENGTH OF STAY: 3 days      HISTORY    CHIEF COMPLAINT   Brian Aaron is a 26 y.o. male, seen during daily webster rounds on the inpatient unit.  Brian Aaron presents with the chief complaint of psychosis    HPI   (Elements: Location, Quality, Severity, Duration, Timing, Content, Modifying Factors, Associated Signs & Symptoms)    The patient was seen and examined. The chart was reviewed.    Staff reports no behavioral or management issues. Patient slept 8 hours last night.  The patient has been compliant with treatment.     On my assessment, reports he slept pretty decent.  Per roommates report, patient remained restless throughout the night.  When asked about the roaches this morning, patient states that he may have been acting "over-imaginitive," and relates some strategies for checking about whether or not the delusions are real.  Patient is still rambling and somewhat disorganized, but more reality based and appropriate than yesterday.  Raised possibility of FORTE, patient is open to it and said he would think about it.  Patient has taken Risperdal Consta before, but not Invega.  Reviewed SE's, Risks/benefits of  FORTE    The patient denied any side effects from current regimen.     Other psychiatric symptoms reviewed and documented below:  Denied Symptoms of Depression: no diminished mood or loss of interest/anhedonia; no irritability, diminished energy, change in sleep, change in appetite, diminished concentration or cognition or indecisiveness, PMA/R, excessive guilt or hopelessness or worthlessness, or suicidal ideations     +Changes in Sleep: +trouble with initiation/maintenance, no early morning awakening with inability to return to sleep, no hypersomnolence     Denied Suicidal/Homicidal ideations: no active/passive ideations, organized plans,or " future intentions     +Symptoms of psychosis: +hallucinations (+AH, +VH), +delusions, +disorganized thinking, no disorganized behavior or abnormal motor behavior, and +negative symptoms (+diminshed emotional expression, no avolition, no anhedonia, no alogia, no asociality); minor improvement today      +Symptoms of herrera or hypomania: no elevated, +expansive, no  irritable mood with +increased energy or activity; with +inflated self-esteem or grandiosity, +decreased need for sleep, +increased rate of speech, +FOI or racing thoughts, +distractibility, increased goal directed activity or PMA, no risky/disinhibited behavior     Denied Symptoms of TERESITA: no excessive anxiety/worry/fear; with no restlessness, fatigue, poor concentration, irritability, muscle tension, or sleep disturbance     Denied Symptoms of Panic Disorder: no recurrent panic attacks; without agoraphobia     Denied Symptoms of PTSD: no h/o trauma; no re-experiencing/intrusive symptoms, avoidant behavior, negative alterations in cognition or mood, or hyperarousal symptoms; without dissociative symptoms      Denied Symptoms of OCD: denied obsessions or compulsions      Denied Symptoms of Eating Disorders: no anorexia, bulimia or binging       ROS  General ROS: negative, +less jaw pain  Ophthalmic ROS: negative  ENT ROS: negative  Allergy and Immunology ROS: negative  Hematological and Lymphatic ROS: negative  Endocrine ROS: negative  Respiratory ROS: no cough, shortness of breath, or wheezing  Cardiovascular ROS: no chest pain or dyspnea on exertion  Gastrointestinal ROS: no abdominal pain, change in bowel habits, or black or bloody stools  Genito-Urinary ROS: no dysuria, trouble voiding, or hematuria  Musculoskeletal ROS: negative  Neurological ROS: no TIA or stroke symptoms  Dermatological ROS: negative    PAST MEDICAL HISTORY   Past Medical History   Diagnosis Date    Anxiety     Depression     History of psychiatric hospitalization     Hx of  "psychiatric care     Psychiatric problem     Psychosis     Schizoaffective disorder     Therapy            PSYCHOTROPIC MEDICATIONS   Scheduled Meds:   multivitamin  1 tablet Oral Daily    risperidone  1 mg Oral Daily    risperidone  2 mg Oral QHS     Continuous Infusions:   PRN Meds:.acetaminophen, aluminum-magnesium hydroxide-simethicone, docusate sodium, hydrOXYzine pamoate, loperamide, nicotine, olanzapine **AND** olanzapine        EXAMINATION    VITALS   Vitals:    01/15/17 0819   BP: 138/64   Pulse: 87   Resp: 18   Temp: 96.7 °F (35.9 °C)       CONSTITUTIONAL  General Appearance: stated age    NEUROLOGIC  Abnormal Involuntary Movements: none  Gait and Station: normal    PSYCHIATRIC   Level of Consciousness: awake, alert  Orientation: person, place, month year  Grooming: fair, did not shower yet today  Psychomotor Behavior: none  Speech: pressured  Mood: "excited"  Affect: somewhat constricted but noted to be enthusiastic when talking about delusions  Thought Process: disorganized, tangential, minor improvement  Thought Content: +paranoid delusions, +AH, +VH, minor improvement   Memory: intact  Attention: distractable  Insight: limited, patient is help seeking and compliant with treatment  Judgment: behavior appropriate for setting        DIAGNOSTIC TESTING   Laboratory Results  Recent Results (from the past 24 hour(s))   CBC auto differential    Collection Time: 01/15/17  7:23 AM   Result Value Ref Range    WBC 5.56 3.90 - 12.70 K/uL    RBC 4.87 4.60 - 6.20 M/uL    Hemoglobin 15.9 14.0 - 18.0 g/dL    Hematocrit 45.1 40.0 - 54.0 %    MCV 93 82 - 98 fL    MCH 32.6 (H) 27.0 - 31.0 pg    MCHC 35.3 32.0 - 36.0 %    RDW 12.6 11.5 - 14.5 %    Platelets 222 150 - 350 K/uL    MPV 10.4 9.2 - 12.9 fL    Gran # 2.3 1.8 - 7.7 K/uL    Lymph # 2.4 1.0 - 4.8 K/uL    Mono # 0.6 0.3 - 1.0 K/uL    Eos # 0.2 0.0 - 0.5 K/uL    Baso # 0.04 0.00 - 0.20 K/uL    Gran% 41.4 38.0 - 73.0 %    Lymph% 43.5 18.0 - 48.0 %    Mono% " 10.1 4.0 - 15.0 %    Eosinophil% 4.3 0.0 - 8.0 %    Basophil% 0.7 0.0 - 1.9 %    Differential Method Automated          MEDICAL DECISION MAKING    DIAGNOSES  Schizoaffective disorder, bipolar type mre manic  Cannabis Abuse  Nicotine Dependence     PROBLEM LIST AND MANAGEMENT PLANS  Psychosis  Summer  Cannabis use  Nicotine use     PRESCRIPTION DRUG MANAGEMENT  Compliance: yes  Side Effects: no  Regimen Adjustments:   Increase Risperdal 2 mg BID for psychosis and summer; plan to switch to FORTE     Consider starting Depakote for adjunctive mood stabilization      Counseled on cannabis and nicotine use- patient is not interested in quitting at this time; he does not want pharmacotherapy     DISCHARGE PLANNING  Expected Disposition Plan: to home      NEED FOR CONTINUED HOSPITALIZATION  Psychiatric illness continues to pose a potential threat to life or bodily function, of self or others, thereby requiring the need for continued inpatient psychiatric hospitalization: Yes, patient remains delusional, not maintaining ADLs, +gravely disabled    Protective inpatient pyschiatric hospitalization required while a safe disposition plan is enacted: Yes    Patient stabilized and ready for discharge from inpatient psychiatric unit: No      STAFF:   Mickey Brennan MD  Psychiatry

## 2017-01-15 NOTE — PLAN OF CARE
Problem: Patient Care Overview (Adult)  Goal: Plan of Care Review  Outcome: Ongoing (interventions implemented as appropriate)  Pt has slept 7 hours with one interruption so far.  NAD.  Resp even & unlabored.  Pathways clear and bed is low.  Q 15 minute safety checks ongoing.  All precautions maintained.

## 2017-01-16 PROCEDURE — 99233 SBSQ HOSP IP/OBS HIGH 50: CPT | Mod: AF,S$PBB,, | Performed by: PSYCHIATRY & NEUROLOGY

## 2017-01-16 PROCEDURE — 25000003 PHARM REV CODE 250: Performed by: PSYCHIATRY & NEUROLOGY

## 2017-01-16 PROCEDURE — 11400000 HC PSYCH PRIVATE ROOM

## 2017-01-16 PROCEDURE — 90833 PSYTX W PT W E/M 30 MIN: CPT | Mod: AF,S$PBB,, | Performed by: PSYCHIATRY & NEUROLOGY

## 2017-01-16 PROCEDURE — 27000339 *HC DAILY SUPPLY KIT

## 2017-01-16 RX ORDER — RISPERIDONE 3 MG/1
3 TABLET ORAL 2 TIMES DAILY
Status: DISCONTINUED | OUTPATIENT
Start: 2017-01-16 | End: 2017-01-20 | Stop reason: HOSPADM

## 2017-01-16 RX ADMIN — RISPERIDONE 2 MG: 2 TABLET ORAL at 08:01

## 2017-01-16 RX ADMIN — THERA TABS 1 TABLET: TAB at 08:01

## 2017-01-16 RX ADMIN — RISPERIDONE 3 MG: 3 TABLET, FILM COATED ORAL at 08:01

## 2017-01-16 NOTE — PLAN OF CARE
Problem: Patient Care Overview (Adult)  Goal: Plan of Care Review  Outcome: Ongoing (interventions implemented as appropriate)  Pt out on unit drawing pictures.Staff reports that earlier today pt kinsey a picture of a man with horns.Now pt is drawing a picture of what he refers to as the zodiac.Pt spent more time in bed today and less interested in football.Less rambling speech today and more isolative.Medication compliant.

## 2017-01-16 NOTE — PLAN OF CARE
Problem: Patient Care Overview (Adult)  Goal: Plan of Care Review  Outcome: Ongoing (interventions implemented as appropriate)  Pt out of room a lot today. Only speaks when spoken to. Drawing/coloring a lot. Draws pictures of superhero, clothes for superhero, and galaxies. Did not exhibit any auditory or visual hallucinations. Participating in group

## 2017-01-16 NOTE — PROGRESS NOTES
PSYCHIATRY DAILY INPATIENT PROGRESS NOTE  SUBSEQUENT HOSPITAL VISIT    ENCOUNTER DATE: 1/16/2017  [facility]    DATE OF ADMISSION: 1/12/2017  1:10 PM  LENGTH OF STAY: 4 days      HISTORY    CHIEF COMPLAINT   Brian Aaron is a 26 y.o. male, seen during daily webster rounds on the inpatient unit.  Brian Aaron presents with the chief complaint of psychosis    HPI   (Elements: Location, Quality, Severity, Duration, Timing, Content, Modifying Factors, Associated Signs & Symptoms)    The patient was seen and examined. The chart was reviewed.    Staff reports no behavioral or management issues.   The patient has been compliant with treatment.     The patient denied any side effects from current regimen.     Denied Symptoms of Depression: no diminished mood or loss of interest/anhedonia; no irritability, diminished energy, change in sleep, change in appetite, diminished concentration or cognition or indecisiveness, PMA/R, excessive guilt or hopelessness or worthlessness, or suicidal ideations     Less Changes in Sleep: less trouble with initiation/maintenance, no early morning awakening with inability to return to sleep, no hypersomnolence     Denied Suicidal/Homicidal ideations: no active/passive ideations, organized plans,or future intentions     Continued Symptoms of psychosis: +hallucinations (+AH, +VH), +delusions, +disorganized thinking (mildly improved), no disorganized behavior or abnormal motor behavior, and +negative symptoms (+diminshed emotional expression, no avolition, no anhedonia, no alogia, no asociality); He maintains that ants in his room are spying on him and that he may have to move to another planet if no one can verify that he is not delusional     Continued Symptoms of herrera or hypomania: no elevated, +expansive, no  irritable mood with +increased energy or activity; with +inflated self-esteem or grandiosity, less decreased need for sleep, +increased rate of speech, +FOI or racing  thoughts, +distractibility,less  goal directed activity or PMA, no risky/disinhibited behavior     Denied Symptoms of anxiety    Counseled on substance use- he does not want to quit at this time.     PSYCHOTHERAPY ADD-ON +[id number]   30 (16-37*) minutes      Time: 16 minutes  Participants: Met with patient    Therapeutic Intervention Type: behavior modifying psychotherapy, supportive psychotherapy  Why chosen therapy is appropriate versus another modality: relevant to diagnosis, patient responds to this modality, evidence based practice    Target symptoms: psychosis  Primary focus: psychosis  Psychotherapeutic techniques: supportive techniques; psycho-education    Outcome monitoring methods: self-report, observation    Patient's response to intervention:  The patient's response to intervention is accepting.    Progress toward goals:  The patient's progress toward goals is limited.          ROS  General ROS: negative,  Ophthalmic ROS: negative  ENT ROS: negative  Allergy and Immunology ROS: negative  Hematological and Lymphatic ROS: negative  Endocrine ROS: negative  Respiratory ROS: no cough, shortness of breath, or wheezing  Cardiovascular ROS: no chest pain or dyspnea on exertion  Gastrointestinal ROS: no abdominal pain, change in bowel habits, or black or bloody stools  Genito-Urinary ROS: no dysuria, trouble voiding, or hematuria  Musculoskeletal ROS: negative  Neurological ROS: no TIA or stroke symptoms  Dermatological ROS: negative    PAST MEDICAL HISTORY   Past Medical History   Diagnosis Date    Anxiety     Depression     History of psychiatric hospitalization     Hx of psychiatric care     Psychiatric problem     Psychosis     Schizoaffective disorder     Therapy            PSYCHOTROPIC MEDICATIONS   Scheduled Meds:   multivitamin  1 tablet Oral Daily    risperidone  2 mg Oral BID     Continuous Infusions:   PRN Meds:.acetaminophen, aluminum-magnesium hydroxide-simethicone, docusate sodium,  "hydrOXYzine pamoate, loperamide, nicotine, olanzapine **AND** olanzapine        EXAMINATION    VITALS   Vitals:    01/16/17 0819   BP: 115/71   Pulse: 93   Resp: 18   Temp: 97.5 °F (36.4 °C)       CONSTITUTIONAL  General Appearance: stated age    NEUROLOGIC  Abnormal Involuntary Movements: none  Gait and Station: normal    PSYCHIATRIC   Level of Consciousness: awake, alert  Orientation: person, place, month year  Grooming: fair, did not shower yet today  Psychomotor Behavior: no PMA/R  Speech: pressured, rapid  Mood: "excited"  Affect: less expansive,   Thought Process: less disorganized, tangential, mild improvement; less JOAN  Thought Content: +paranoid delusions, +AH, +VH, no HI/SI  Memory: intact to recent and remote events  Attention: distractable  Insight: limited, patient is help seeking and compliant with treatment  Judgment: good- behavior appropriate for setting, complinat        DIAGNOSTIC TESTING   Laboratory Results  No results found for this or any previous visit (from the past 24 hour(s)).      MEDICAL DECISION MAKING    DIAGNOSES  Schizoaffective disorder, bipolar type mre manic  Cannabis Abuse  Nicotine Dependence     PROBLEM LIST AND MANAGEMENT PLANS  Psychosis  Summer  Cannabis use  Nicotine use     PRESCRIPTION DRUG MANAGEMENT  Compliance: yes  Side Effects: no  Regimen Adjustments:   Increase Risperdal to 3 mg BID for psychosis and summer; plan to switch to FORTE if patient is agreeable     Consider starting Depakote for adjunctive mood stabilization      Counseled on cannabis and nicotine use- patient is not interested in quitting at this time; he does not want pharmacotherapy     DISCHARGE PLANNING  Expected Disposition Plan: to home once stable with outpatiet follow up care      NEED FOR CONTINUED HOSPITALIZATION  Psychiatric illness continues to pose a potential threat to life or bodily function, of self or others, thereby requiring the need for continued inpatient psychiatric hospitalization: " Yes, patient remains delusional, not maintaining ADLs, +gravely disabled    Protective inpatient pyschiatric hospitalization required while a safe disposition plan is enacted: Yes    Patient stabilized and ready for discharge from inpatient psychiatric unit: No      STAFF:   Mendez Cabrales MD  Psychiatry

## 2017-01-16 NOTE — PLAN OF CARE
Problem: Overarching Goals (Adult)  Goal: Develops/Participates in Therapeutic [place] to Support Successful Transition  Outcome: Ongoing (interventions implemented as appropriate)  Patient did not attend psychotherapy group as he was meeting with the doctor. Will meet with patient later. Will continue to encourage patient participation in group.

## 2017-01-16 NOTE — PLAN OF CARE
Problem: Patient Care Overview (Adult)  Goal: Plan of Care Review  Outcome: Ongoing (interventions implemented as appropriate)  Pt has slept throughout the night without interruption, 7 hours so far.  NAD.  Resp even & unlabored. Pathways clear and bed is low.  Q 15 minute safety checks ongoing.  All precautions maintained.

## 2017-01-17 PROCEDURE — 27000339 *HC DAILY SUPPLY KIT

## 2017-01-17 PROCEDURE — 11400000 HC PSYCH PRIVATE ROOM

## 2017-01-17 PROCEDURE — 90833 PSYTX W PT W E/M 30 MIN: CPT | Mod: AF,S$PBB,, | Performed by: PSYCHIATRY & NEUROLOGY

## 2017-01-17 PROCEDURE — 25000003 PHARM REV CODE 250: Performed by: PSYCHIATRY & NEUROLOGY

## 2017-01-17 PROCEDURE — 99233 SBSQ HOSP IP/OBS HIGH 50: CPT | Mod: AF,S$PBB,, | Performed by: PSYCHIATRY & NEUROLOGY

## 2017-01-17 PROCEDURE — 63600175 PHARM REV CODE 636 W HCPCS: Performed by: PSYCHIATRY & NEUROLOGY

## 2017-01-17 RX ADMIN — RISPERIDONE 3 MG: 3 TABLET, FILM COATED ORAL at 09:01

## 2017-01-17 RX ADMIN — THERA TABS 1 TABLET: TAB at 08:01

## 2017-01-17 RX ADMIN — PALIPERIDONE PALMITATE 234 MG: 234 INJECTION INTRAMUSCULAR at 11:01

## 2017-01-17 RX ADMIN — RISPERIDONE 3 MG: 3 TABLET, FILM COATED ORAL at 08:01

## 2017-01-17 NOTE — PLAN OF CARE
"Problem: Patient Care Overview (Adult)  Goal: Individualization & Mutuality  Outcome: Ongoing (interventions implemented as appropriate)  Up on the unit in the dayroom. Depressed mood. Flat affect. Denies SI or HI or plans. Calm and cooperative. Unkempt. Pt stated,"I feel 16 and a half. I feel 10 years younger. I am a very spiritual person." Rambling. Disorganized thoughts. Guarded. Eating meals and accepting medications. Gave Invega Sustenna IM today. Tolerated well. Pt reports he slept well last night. Will continue to monitor.          "

## 2017-01-17 NOTE — PROGRESS NOTES
PSYCHIATRY DAILY INPATIENT PROGRESS NOTE  SUBSEQUENT HOSPITAL VISIT    ENCOUNTER DATE: 1/17/2017  [facility]    DATE OF ADMISSION: 1/12/2017  1:10 PM  LENGTH OF STAY: 5 days      HISTORY    CHIEF COMPLAINT   Brian Aaron is a 26 y.o. male, seen during daily webster rounds on the inpatient unit.  Brian Aaron presents with the chief complaint of psychosis    HPI   (Elements: Location, Quality, Severity, Duration, Timing, Content, Modifying Factors, Associated Signs & Symptoms)    The patient was seen and examined. The chart was reviewed.    Staff reports no behavioral or management issues.   The patient has been compliant with treatment.     The patient denied any side effects from current regimen.     Denied Symptoms of Depression: no diminished mood or loss of interest/anhedonia; no irritability, diminished energy, change in sleep, change in appetite, diminished concentration or cognition or indecisiveness, PMA/R, excessive guilt or hopelessness or worthlessness, or suicidal ideations     Improved Changes in Sleep: no trouble with initiation/maintenance, no early morning awakening with inability to return to sleep, no hypersomnolence     Denied Suicidal/Homicidal ideations: no active/passive ideations, organized plans,or future intentions     Continued but less Symptoms of psychosis: less hallucinations (less AH/VH), less delusions (persist, but discuss only when prompted), less disorganized thinking (mildly improved), no disorganized behavior or abnormal motor behavior, and +negative symptoms (+diminshed emotional expression, no avolition, no anhedonia, no alogia, no asociality)     Continued but less Symptoms of herrera or hypomania: no elevated, less expansive, no irritable mood with less increased energy or activity; with less inflated self-esteem or grandiosity, less decreased need for sleep, less increased rate of speech, less FOI or racing thoughts, less distractibility, less goal directed  activity or PMA, no risky/disinhibited behavior     Denied Symptoms of anxiety    Counseled on substance use- he wants outpatient therapy/tx. He does not want pharmacotherapy or inpatient treatment.     PSYCHOTHERAPY ADD-ON [id number]   30 (16-37*) minutes      Time: 16 minutes  Participants: Met with patient    Therapeutic Intervention Type: behavior modifying psychotherapy, supportive psychotherapy  Why chosen therapy is appropriate versus another modality: relevant to diagnosis, patient responds to this modality, evidence based practice    Target symptoms: psychosis  Primary focus: psychosis and substance use  Psychotherapeutic techniques: supportive and behavioral techniques; psycho-education; coping skills; motivational techniques    Outcome monitoring methods: self-report, observation    Patient's response to intervention:  The patient's response to intervention is accepting.    Progress toward goals:  The patient's progress toward goals is fair .          ROS  General ROS: negative,  Ophthalmic ROS: negative  ENT ROS: negative  Allergy and Immunology ROS: negative  Hematological and Lymphatic ROS: negative  Endocrine ROS: negative  Respiratory ROS: no cough, shortness of breath, or wheezing  Cardiovascular ROS: no chest pain or dyspnea on exertion  Gastrointestinal ROS: no abdominal pain, change in bowel habits, or black or bloody stools  Genito-Urinary ROS: no dysuria, trouble voiding, or hematuria  Musculoskeletal ROS: negative  Neurological ROS: no TIA or stroke symptoms  Dermatological ROS: negative    PAST MEDICAL HISTORY   Past Medical History   Diagnosis Date    Anxiety     Depression     History of psychiatric hospitalization     Hx of psychiatric care     Psychiatric problem     Psychosis     Schizoaffective disorder     Therapy            PSYCHOTROPIC MEDICATIONS   Scheduled Meds:   multivitamin  1 tablet Oral Daily    risperidone  3 mg Oral BID     Continuous Infusions:   PRN  "Meds:.acetaminophen, aluminum-magnesium hydroxide-simethicone, docusate sodium, hydrOXYzine pamoate, loperamide, nicotine, olanzapine **AND** olanzapine        EXAMINATION    VITALS   Vitals:    01/17/17 0800   BP: (!) 147/75   Pulse: 95   Resp: 18   Temp: 97.7 °F (36.5 °C)       CONSTITUTIONAL  General Appearance: stated age    NEUROLOGIC  Abnormal Involuntary Movements: none  Gait and Station: normal    PSYCHIATRIC   Level of Consciousness: awake, alert  Orientation: person, place, month year  Grooming: fair, but with diminished ADLs  Psychomotor Behavior: no PMA/R  Speech: pressured, rapid  Mood: "ok"  Affect: less expansive,   Thought Process: less disorganized,  +tangential, mild improvement; less JOAN  Thought Content: +paranoid delusions, +AH, +VH, no HI/SI  Memory: intact to recent and remote events  Attention: less distractable  Insight: improving- patient is help seeking and compliant with treatment; will ing to entertain that delusions may be inaccurate   Judgment: good- behavior appropriate for setting, complinat        DIAGNOSTIC TESTING   Laboratory Results  No results found for this or any previous visit (from the past 24 hour(s)).      MEDICAL DECISION MAKING    DIAGNOSES  Schizoaffective disorder, bipolar type mre manic  Cannabis Abuse  Nicotine Dependence     PROBLEM LIST AND MANAGEMENT PLANS  Psychosis  Summer  Cannabis use  Nicotine use     PRESCRIPTION DRUG MANAGEMENT  Compliance: yes  Side Effects: no  Regimen Adjustments:   -Increased Risperdal to 3 mg BID for psychosis and summer  -Start Invega FORTE- 234 mg x 1 on 1/17/17 and 156 mg IM x 1 on 1/20/17; 3rd dose due on approximately 2/20/17- will taper off of PO meds as FORTE becomes therapeutic.       Counseled on cannabis and nicotine use- patient is interested in quitting at this time; he does not want pharmacotherapy; he is interested in seeing a counselor and attending cessation programs     DISCHARGE PLANNING  Expected Disposition Plan: to home " once stable with outpatiet follow up care      NEED FOR CONTINUED HOSPITALIZATION  Psychiatric illness continues to pose a potential threat to life or bodily function, of self or others, thereby requiring the need for continued inpatient psychiatric hospitalization: Yes, patient remains delusional, not maintaining ADLs, +gravely disabled    Protective inpatient pyschiatric hospitalization required while a safe disposition plan is enacted: Yes    Patient stabilized and ready for discharge from inpatient psychiatric unit: No      STAFF:   Mendez Cabrales MD  Psychiatry

## 2017-01-17 NOTE — PLAN OF CARE
Problem: Decreased Participation/Engagement (Adult)  Goal: Increased Participation/Engagement  Outcome: Ongoing (interventions implemented as appropriate)  Group Note     Behavior:  Patient attended Psychotherapy Group and participated. Patient presents with improved mood and affect. Patient quiet, but attentive and participates.       Intervention:  Practicing Flexibility for Good Mental Health. Counselor shared with group a reflection on the virtue of flexibility. Discussed how focusing on negatives or positives can affect feelings, actions, and outcomes. Encouraged group members to take a belief they have and try to see it from another perspective. Reviewed ways to improve flexibility.     Response:  Patient participated and contributed to the discussion. Patient responded to others comments and offered suggestions. When discussing way to ask someone to be quiet Patient pointed out non verbal cues and even signs as an option. Patient agreed that he could practice being more flexible in his daily encounters.          Plan:  Will continue to encourage Patient participation in group.

## 2017-01-18 PROCEDURE — 11400000 HC PSYCH PRIVATE ROOM

## 2017-01-18 PROCEDURE — 25000003 PHARM REV CODE 250: Performed by: PSYCHIATRY & NEUROLOGY

## 2017-01-18 PROCEDURE — 99233 SBSQ HOSP IP/OBS HIGH 50: CPT | Mod: AF,S$PBB,, | Performed by: PSYCHIATRY & NEUROLOGY

## 2017-01-18 PROCEDURE — 27000339 *HC DAILY SUPPLY KIT

## 2017-01-18 RX ADMIN — RISPERIDONE 3 MG: 3 TABLET, FILM COATED ORAL at 08:01

## 2017-01-18 RX ADMIN — THERA TABS 1 TABLET: TAB at 08:01

## 2017-01-18 NOTE — PLAN OF CARE
"Problem: Patient Care Overview (Adult)  Goal: Plan of Care Review  Outcome: Ongoing (interventions implemented as appropriate)  Out on unit in dayroom watching TV but noted to be sitting away from the other and to himself with a blunted affect.  Verbalized that he is "okay".  Will continue to monitor. Safety and precautions maintained, bed low and pathway kept clear.      "

## 2017-01-18 NOTE — PROGRESS NOTES
PSYCHIATRY DAILY INPATIENT PROGRESS NOTE  SUBSEQUENT HOSPITAL VISIT    ENCOUNTER DATE: 1/18/2017  SITE: [facility]    DATE OF ADMISSION: 1/12/2017  1:10 PM  LENGTH OF STAY: 6 days      HISTORY    CHIEF COMPLAINT   Brian Aaron is a 26 y.o. male, seen during daily webster rounds on the inpatient unit.  Brian Aaron presents with the chief complaint of psychosis    HPI   (Elements: Location, Quality, Severity, Duration, Timing, Content, Modifying Factors, Associated Signs & Symptoms)    The patient was seen and examined. The chart was reviewed.    Staff reports no behavioral or management issues.   The patient has been compliant with treatment.     The patient denied any side effects from current regimen. He received FORTE dose #1 yesterday without issue.     Denied Symptoms of Depression: no diminished mood or loss of interest/anhedonia; no irritability, diminished energy, change in sleep, change in appetite, diminished concentration or cognition or indecisiveness, PMA/R, excessive guilt or hopelessness or worthlessness, or suicidal ideations     Improved Changes in Sleep: no trouble with initiation/maintenance, no early morning awakening with inability to return to sleep, no hypersomnolence     Denied Suicidal/Homicidal ideations: no active/passive ideations, organized plans,or future intentions     Continued but less Symptoms of psychosis: less hallucinations (less AH/VH), less delusions (persist, but discuss only when prompted), less disorganized thinking (mildly improved), no disorganized behavior or abnormal motor behavior, and +negative symptoms (+diminshed emotional expression, no avolition, no anhedonia, no alogia, no asociality)     Continued but less Symptoms of herrera or hypomania: no elevated, less expansive, no irritable mood with less increased energy or activity; with less inflated self-esteem or grandiosity, less decreased need for sleep, less increased rate of speech, less FOI or racing  "thoughts, less distractibility, less goal directed activity or PMA, no risky/disinhibited behavior     Denied Symptoms of anxiety    Counseled on substance use- he wants outpatient therapy/tx. He does not want pharmacotherapy or inpatient treatment.             ROS  General ROS: negative,  Ophthalmic ROS: negative  ENT ROS: negative  Allergy and Immunology ROS: negative  Hematological and Lymphatic ROS: negative  Endocrine ROS: negative  Respiratory ROS: no cough, shortness of breath, or wheezing  Cardiovascular ROS: no chest pain or dyspnea on exertion  Gastrointestinal ROS: no abdominal pain, change in bowel habits, or black or bloody stools  Genito-Urinary ROS: no dysuria, trouble voiding, or hematuria  Musculoskeletal ROS: negative  Neurological ROS: no TIA or stroke symptoms  Dermatological ROS: negative    PAST MEDICAL HISTORY   Past Medical History   Diagnosis Date    Anxiety     Depression     History of psychiatric hospitalization     Hx of psychiatric care     Psychiatric problem     Psychosis     Schizoaffective disorder     Therapy            PSYCHOTROPIC MEDICATIONS   Scheduled Meds:   multivitamin  1 tablet Oral Daily    [START ON 1/20/2017] paliperidone palmitate  156 mg Intramuscular Once    risperidone  3 mg Oral BID     Continuous Infusions:   PRN Meds:.acetaminophen, aluminum-magnesium hydroxide-simethicone, docusate sodium, hydrOXYzine pamoate, loperamide, nicotine, olanzapine **AND** olanzapine        EXAMINATION    VITALS   Vitals:    01/18/17 0736   BP: (!) 130/59   Pulse: 88   Resp: 18   Temp: 97.7 °F (36.5 °C)       CONSTITUTIONAL  General Appearance: stated age    NEUROLOGIC  Abnormal Involuntary Movements: none  Gait and Station: normal    PSYCHIATRIC   Level of Consciousness: awake, alert  Orientation: person, place, month year  Grooming: fair, but with diminished ADLs  Psychomotor Behavior: no PMA/R  Speech: pressured, rapid  Mood: "ok"  Affect: less expansive,   Thought " Process: less disorganized,  Less tangential,  less JOAN  Thought Content: less paranoid delusions/AH/VH, no HI/SI  Memory: intact to recent and remote events  Attention: less distractible; improving  Insight: improving- patient is help seeking and compliant with treatment; will ing to entertain that delusions may be inaccurate   Judgment: good- behavior appropriate for setting, complinat        DIAGNOSTIC TESTING   Laboratory Results  No results found for this or any previous visit (from the past 24 hour(s)).      MEDICAL DECISION MAKING    DIAGNOSES  Schizoaffective disorder, bipolar type mre manic  Cannabis Abuse  Nicotine Dependence     PROBLEM LIST AND MANAGEMENT PLANS  Psychosis  Summer  Cannabis use  Nicotine use     PRESCRIPTION DRUG MANAGEMENT  Compliance: yes  Side Effects: no  Regimen Adjustments:   -Risperdal to 3 mg BID for psychosis and summer  - Invega FORTE- 234 mg x 1 on 1/17/17 and 156 mg IM x 1 on 1/20/17; 3rd dose due on approximately 2/20/17- will taper off of PO meds as FORTE becomes therapeutic (as outpatient).       Counseled on cannabis and nicotine use- patient is interested in quitting at this time; he does not want pharmacotherapy; he is interested in seeing a counselor and attending cessation programs     DISCHARGE PLANNING  Expected Disposition Plan: [facility]      NEED FOR CONTINUED HOSPITALIZATION  Psychiatric illness continues to pose a potential threat to life or bodily function, of self or others, thereby requiring the need for continued inpatient psychiatric hospitalization: Yes, patient remains delusional, not maintaining ADLs, +gravely disabled    Protective inpatient pyschiatric hospitalization required while a safe disposition plan is enacted: Yes    Patient stabilized and ready for discharge from inpatient psychiatric unit: No      STAFF:   Mendez Cabrales MD  Psychiatry

## 2017-01-18 NOTE — PLAN OF CARE
Problem: Patient Care Overview (Adult)  Goal: Discharge Needs Assessment  Outcome: Ongoing (interventions implemented as appropriate)  Patient needs transportation home.

## 2017-01-19 PROCEDURE — 25000003 PHARM REV CODE 250: Performed by: PSYCHIATRY & NEUROLOGY

## 2017-01-19 PROCEDURE — 27000339 *HC DAILY SUPPLY KIT

## 2017-01-19 PROCEDURE — 99233 SBSQ HOSP IP/OBS HIGH 50: CPT | Mod: AF,S$PBB,, | Performed by: PSYCHIATRY & NEUROLOGY

## 2017-01-19 PROCEDURE — 11400000 HC PSYCH PRIVATE ROOM

## 2017-01-19 RX ADMIN — RISPERIDONE 3 MG: 3 TABLET, FILM COATED ORAL at 08:01

## 2017-01-19 RX ADMIN — NICOTINE 1 PATCH: 14 PATCH, EXTENDED RELEASE TRANSDERMAL at 08:01

## 2017-01-19 RX ADMIN — THERA TABS 1 TABLET: TAB at 08:01

## 2017-01-19 NOTE — PLAN OF CARE
Problem: Overarching Goals (Adult)  Goal: Develops/Participates in Therapeutic [place] to Support Successful Transition  Group Note      Behavior:  Patient attended Psychotherapy Group and participated. Patient presents with improved mood and affect.      Intervention:  Choosing Your Goals. Patients completed handout choosing their level of satisfaction in several life areas. Patients listed three areas where they would like to set a goal. Discussed setting goals, taking steps toward goals, following through and getting support.       Response:  Patient noted he is satisfied with education, hobbies and health. Patient states he is dissatisfied with housing- would like to have his own place, and his romantic relationships and doctor. Pt notes a goal to improve his housing would depend on another goal of getting a job.       Plan:  Will continue to encourage Patient participation in group.

## 2017-01-19 NOTE — PLAN OF CARE
"Problem: Overarching Goals (Adult)  Goal: Develops/Participates in Therapeutic [place] to Support Successful Transition  Outcome: Ongoing (interventions implemented as appropriate)  Group Note     Behavior:  Patient attended Psychotherapy Group and participated. Patient presents with calm mood and affect.      Intervention:  Daily Affirmations. Patients chose an affirmation and shared it with the group and discussed the meaning and how it could apply in their lives.      Response:  Patient read "Just for today I will hold a compliment and let it nurture me." Patient notes that gives him more encouragement. Acknowledged that we don't always accept compliments well. Agreed that reminding yourself of previous compliment can help get through rough times.      Plan:  Will continue to encourage patient participation in group.        "

## 2017-01-19 NOTE — PLAN OF CARE
Problem: Patient Care Overview (Adult)  Goal: Plan of Care Review  Outcome: Ongoing (interventions implemented as appropriate)  Pt in bed resting.Pt reports poor sleep last night.Appitite good and hygiene/grooming poor.Pt asked about seeing roaches today and says no.States he saw one yesterday.Rambling speech.

## 2017-01-19 NOTE — PROGRESS NOTES
PSYCHIATRY DAILY INPATIENT PROGRESS NOTE  SUBSEQUENT HOSPITAL VISIT    ENCOUNTER DATE: 1/19/2017  [facility] [place]    DATE OF ADMISSION: 1/12/2017  1:10 PM  LENGTH OF STAY: 7 days      HISTORY    CHIEF COMPLAINT   Brian Aaron is a 26 y.o. male, seen during daily webster rounds on the inpatient unit.  Brian Aaron presents with the chief complaint of psychosis    HPI   (Elements: Location, Quality, Severity, Duration, Timing, Content, Modifying Factors, Associated Signs & Symptoms)    The patient was seen and examined. The chart was reviewed.    Staff reports no behavioral or management issues.   The patient has been compliant with treatment.     The patient denied any side effects from current regimen. He received FORTE dose #1 yesterday without issue.     Denied Symptoms of Depression: no diminished mood or loss of interest/anhedonia; no irritability, diminished energy, change in sleep, change in appetite, diminished concentration or cognition or indecisiveness, PMA/R, excessive guilt or hopelessness or worthlessness, or suicidal ideations     Improved Changes in Sleep: no trouble with initiation/maintenance, no early morning awakening with inability to return to sleep, no hypersomnolence     Denied Suicidal/Homicidal ideations: no active/passive ideations, organized plans,or future intentions     Improving Symptoms of psychosis: less hallucinations (less AH/VH), less delusions (persist, but discuss only when prompted), less disorganized thinking (mildly improved), no disorganized behavior or abnormal motor behavior, and +negative symptoms (+diminshed emotional expression, no avolition, no anhedonia, no alogia, no asociality)     Improving Symptoms of herrera or hypomania: no elevated, less expansive, no irritable mood with less increased energy or activity; with less inflated self-esteem or grandiosity, less decreased need for sleep, less increased rate of speech, less FOI or racing thoughts, less  "distractibility, less goal directed activity or PMA, no risky/disinhibited behavior     Denied Symptoms of anxiety    Counseled on substance use- he wants outpatient therapy/tx. He does not want pharmacotherapy or inpatient treatment.             ROS  General ROS: negative,  Ophthalmic ROS: negative  ENT ROS: negative  Allergy and Immunology ROS: negative  Hematological and Lymphatic ROS: negative  Endocrine ROS: negative  Respiratory ROS: no cough, shortness of breath, or wheezing  Cardiovascular ROS: no chest pain or dyspnea on exertion  Gastrointestinal ROS: no abdominal pain, change in bowel habits, or black or bloody stools  Genito-Urinary ROS: no dysuria, trouble voiding, or hematuria  Musculoskeletal ROS: negative  Neurological ROS: no TIA or stroke symptoms  Dermatological ROS: negative    PAST MEDICAL HISTORY   Past Medical History   Diagnosis Date    Anxiety     Depression     History of psychiatric hospitalization     Hx of psychiatric care     Psychiatric problem     Psychosis     Schizoaffective disorder     Therapy            PSYCHOTROPIC MEDICATIONS   Scheduled Meds:   multivitamin  1 tablet Oral Daily    [START ON 1/20/2017] paliperidone palmitate  156 mg Intramuscular Once    risperidone  3 mg Oral BID     Continuous Infusions:   PRN Meds:.acetaminophen, aluminum-magnesium hydroxide-simethicone, docusate sodium, hydrOXYzine pamoate, loperamide, nicotine, olanzapine **AND** olanzapine        EXAMINATION    VITALS   Vitals:    01/19/17 0758   BP: (!) 133/91   Pulse: 89   Resp: 18   Temp: 98.2 °F (36.8 °C)       CONSTITUTIONAL  General Appearance: stated age    NEUROLOGIC  Abnormal Involuntary Movements: none  Gait and Station: normal    PSYCHIATRIC   Level of Consciousness: awake, alert  Orientation: person, place, month year  Grooming: fair, but with diminished ADLs  Psychomotor Behavior: no PMA/R  Speech: nl r/t/v/s  Mood: "ok"  Affect: less expansive,   Thought Process: less " disorganized,  Less tangential,  no JOAN  Thought Content: less paranoid delusions/AH/VH, no HI/SI  Memory: intact to recent and remote events  Attention: less distractible; improving  Insight: improving- patient is help seeking and compliant with treatment; will ing to entertain that delusions may be inaccurate   Judgment: good- behavior appropriate for setting, complinat        DIAGNOSTIC TESTING   Laboratory Results  No results found for this or any previous visit (from the past 24 hour(s)).      MEDICAL DECISION MAKING    DIAGNOSES  Schizoaffective disorder, bipolar type mre manic  Cannabis Abuse  Nicotine Dependence     PROBLEM LIST AND MANAGEMENT PLANS  Psychosis  Summer  Cannabis use  Nicotine use     PRESCRIPTION DRUG MANAGEMENT  Compliance: yes  Side Effects: no  Regimen Adjustments:   -Risperdal 3 mg BID for psychosis and summer  -Invega FORTE- 234 mg x 1 on 1/17/17 and 156 mg IM x 1 on 1/20/17; 3rd dose due on approximately 2/20/17- will taper off of PO meds as FORTE becomes therapeutic (as outpatient).       Counseled on cannabis and nicotine use- patient is interested in quitting at this time; he does not want pharmacotherapy; he is interested in seeing a counselor and attending cessation programs     DISCHARGE PLANNING  Expected Dis[facility] Plan: [facility]      NEED FOR CONTINUED HOSPITALIZATION  Psychiatric illness continues to pose a potential threat to life or bodily function, of self or others, thereby requiring the need for continued inpatient psychiatric hospitalization: Yes, patient remains delusional, not maintaining ADLs, +gravely disabled    Protective inpatient pyschiatric hospitalization required while a safe disposition plan is enacted: Yes    Patient stabilized and ready for discharge from inpatient psychiatric unit: No      STAFF:   Mendez Cabrales MD  Psychiatry

## 2017-01-19 NOTE — PLAN OF CARE
"Problem: Patient Care Overview (Adult)  Goal: Plan of Care Review  Outcome: Ongoing (interventions implemented as appropriate)  Lying quiet in bed, eyes closed and respiration even and unlabored, appeared asleep.  Slept well all shift with 2 awakening so far.  Safety precautions maintained, bed low & pathway kept clear. At beginning of shift pt isolating in room but out for meals and meds. Pt denies SI/HI, AH/VH; however, pt told this writer that he saw a mcdonald early in the morning and then began talking about the roaches at his home and the size of them. Pt started asking this writer if roaches can grow that size and then voiced he wanted to talk to an  to find out the answers to his questions because he doesn't "want the roaches to carry diseases and start infecting people". Then he was concerned that he "might have brought roaches from home" and that they got to the hospital "by being carried by me". Instructed pt to inform staff if he sees roaches in future. Will continue to monitor patient.       "

## 2017-01-20 VITALS
WEIGHT: 146 LBS | HEIGHT: 71 IN | RESPIRATION RATE: 18 BRPM | BODY MASS INDEX: 20.44 KG/M2 | TEMPERATURE: 98 F | HEART RATE: 80 BPM | DIASTOLIC BLOOD PRESSURE: 65 MMHG | SYSTOLIC BLOOD PRESSURE: 114 MMHG

## 2017-01-20 PROCEDURE — 25000003 PHARM REV CODE 250: Performed by: PSYCHIATRY & NEUROLOGY

## 2017-01-20 PROCEDURE — 99239 HOSP IP/OBS DSCHRG MGMT >30: CPT | Mod: AF,S$PBB,, | Performed by: PSYCHIATRY & NEUROLOGY

## 2017-01-20 PROCEDURE — 63600175 PHARM REV CODE 636 W HCPCS: Performed by: PSYCHIATRY & NEUROLOGY

## 2017-01-20 RX ORDER — RISPERIDONE 3 MG/1
3 TABLET ORAL 2 TIMES DAILY
Qty: 60 TABLET | Refills: 1 | Status: SHIPPED | OUTPATIENT
Start: 2017-01-20 | End: 2018-01-20

## 2017-01-20 RX ADMIN — RISPERIDONE 3 MG: 3 TABLET, FILM COATED ORAL at 08:01

## 2017-01-20 RX ADMIN — THERA TABS 1 TABLET: TAB at 08:01

## 2017-01-20 RX ADMIN — PALIPERIDONE PALMITATE 156 MG: 156 INJECTION INTRAMUSCULAR at 11:01

## 2017-01-20 RX ADMIN — HYDROXYZINE PAMOATE 50 MG: 50 CAPSULE ORAL at 01:01

## 2017-01-20 NOTE — DISCHARGE SUMMARY
"Discharge Summary  Psychiatry    Admit Date: 1/12/2017    Discharge Date and Time: 1/20/2017 9:30 AM    Attending Physician: Mendez Cabrales MD     Discharge Provider: Mendez Cabrales MD    Reason for Admission:  psychosis    History of Present Illness:   The patient presented to the ER on 1/12/17 under an OPC with complaints of bizarre behavior. The patient was medically cleared and admitted to the [facility].      The patient has been experiencing AVH (chronic condition) with recent worsening of symptoms (unknowntime frame). He stated that he has been having thoughts of hurting things that hurt him like dogs, spiders and roaches. The roaches ars 4-5 feet long and people put their spirits in them; the wires outside give them electrical energy and they try to attack him . He tells of a dream about a dog on the couch, and "I thought he was dying so I hit him and he got up ,burped, ,farted, and jumped off the couch." He has recently been non-compliant with his medication and abuses cannabis.       He has significant and impairing s/s of psychosis with bizarre delusions, AVH, disorganized thought process and negative symptoms.      He was a poor historian and the history provided below was limited.     Denied Symptoms of Depression: no diminished mood or loss of interest/anhedonia; no irritability, diminished energy, change in sleep, change in appetite, diminished concentration or cognition or indecisiveness, PMA/R, excessive guilt or hopelessness or worthlessness, or suicidal ideations     +Changes in Sleep: +trouble with initiation/maintenance, no early morning awakening with inability to return to sleep, no hypersomnolence     Denied Suicidal/Homicidal ideations: no active/passive ideations, organized plans,or future intentions     +Symptoms of psychosis: +hallucinations, +delusions, +disorganized thinking, no disorganized behavior or abnormal motor behavior, and +negative symptoms (+diminshed emotional " expression, no avolition, no anhedonia, no alogia, no asociality)      +Symptoms of herrera or hypomania: no elevated, +expansive, no  irritable mood with +increased energy or activity; with +inflated self-esteem or grandiosity, +decreased need for sleep, +increased rate of speech, +FOI or racing thoughts, +distractibility, increased goal directed activity or PMA, no risky/disinhibited behavior     Denied Symptoms of TERESITA: no excessive anxiety/worry/fear; with no restlessness, fatigue, poor concentration, irritability, muscle tension, or sleep disturbance     Denied Symptoms of Panic Disorder: no recurrent panic attacks; without agoraphobia     Denied Symptoms of PTSD: no h/o trauma; no re-experiencing/intrusive symptoms, avoidant behavior, negative alterations in cognition or mood, or hyperarousal symptoms; without dissociative symptoms      Denied Symptoms of OCD: denied obsessions or compulsions      Denied Symptoms of Eating Disorders: no anorexia, bulimia or binging     +Substance Use: +intoxication, no withdrawal, no tolerance, no used in larger amounts or duration than intended, no unsuccessful attempts to limit or quit, no increased time engaging in or seeking out, +cravings or strong desire to use, no failure to fulfill obligations, no negative consequences in social/interpersonal/occupational,/recreational areas, no use in dangerous situations, +medical or psychological consequences        Procedures Performed: * No surgery found *    Hospital Course (synopsis of major diagnoses, care, treatment, and services provided during the course of the hospital stay):   The patient was stabilized and discharged on the following medications:  -Risperdal 3 mg BID for psychosis and herrera  -Invega FORTE- 234 mg x 1 on 1/17/17 and 156 mg IM x 1 on 1/20/17; 3rd dose due on approximately 2/20/17- will taper off of PO meds as FORTE becomes therapeutic (as outpatient).       The patient was compliant with treatment. The patient  denied any side effects.     Denied Symptoms of Depression: no diminished mood or loss of interest/anhedonia; no irritability, diminished energy, change in sleep, change in appetite, diminished concentration or cognition or indecisiveness, PMA/R, excessive guilt or hopelessness or worthlessness, or suicidal ideations      Improved Changes in Sleep: no trouble with initiation/maintenance, no early morning awakening with inability to return to sleep, no hypersomnolence      Denied Suicidal/Homicidal ideations: no active/passive ideations, organized plans,or future intentions      Improved Symptoms of psychosis: no hallucinations (no AH/VH), less delusions (persist, but does not discuss anyore and no longer bx/fx impairing), no disorganized thinking ( improved), no disorganized behavior or abnormal motor behavior, and no negative symptoms (less diminshed emotional expression, no avolition, no anhedonia, no alogia, no asociality)      Improved Symptoms of herrera or hypomania: no elevated, no expansive, no irritable mood with no increased energy or activity; with no inflated self-esteem or grandiosity, no decreased need for sleep, no increased rate of speech, no FOI or racing thoughts, no distractibility, no goal directed activity or PMA, no risky/disinhibited behavior      Denied Symptoms of anxiety     Counseled on substance use- he wants outpatient therapy/tx. He does not want pharmacotherapy or inpatient treatment.     Discussed diagnosis, risks and benefits of proposed treatment vs alternative treatments vs no treatment, and potential side effects of these treatments.  The patient expresses understanding of the above and displays the capacity to agree with this treatment given said understanding.  Patient also agrees that, currently, the benefits outweigh the risks and would like to pursue treatment at this time.    MSE: stated age, casually dressed, well groomed.  No psychomotor agitation or retardation.  No  abnormal involuntary movements.  Gait normal.  Speech normal, conversational.  Language fluent English. Mood fine.  Affect normal range, pleasant, euthymic.  Thought process linear.  Associations intact.  Denies suicidal or homicidal ideation.  Denies auditory hallucinations, paranoid ideation, ideas of reference.  Memory intact.  Attention intact.  Fund of knowledge intact.  Insight intact.  Judgment intact.  Alert and oriented to person, place, time.      Tobacco Usage:  Is patient a smoker? Yes  Does patient want prescription for Tobacco Cessation? No  Does patient want counseling for Tobacco Cessation? No    Final Diagnoses:    Principal Problem: Schizoaffective disorder, bipolar type mre manic   Secondary Diagnoses:   Cannabis Abuse  Nicotine Dependence     Labs:  Admission on 01/12/2017   Component Date Value Ref Range Status    WBC 01/14/2017 5.75  3.90 - 12.70 K/uL Final    RBC 01/14/2017 4.84  4.60 - 6.20 M/uL Final    Hemoglobin 01/14/2017 16.1  14.0 - 18.0 g/dL Final    Hematocrit 01/14/2017 45.0  40.0 - 54.0 % Final    MCV 01/14/2017 93  82 - 98 fL Final    MCH 01/14/2017 33.3* 27.0 - 31.0 pg Final    MCHC 01/14/2017 35.8  32.0 - 36.0 % Final    RDW 01/14/2017 12.7  11.5 - 14.5 % Final    Platelets 01/14/2017 218  150 - 350 K/uL Final    MPV 01/14/2017 10.2  9.2 - 12.9 fL Final    Gran # 01/14/2017 2.8  1.8 - 7.7 K/uL Final    Lymph # 01/14/2017 2.2  1.0 - 4.8 K/uL Final    Mono # 01/14/2017 0.6  0.3 - 1.0 K/uL Final    Eos # 01/14/2017 0.2  0.0 - 0.5 K/uL Final    Baso # 01/14/2017 0.03  0.00 - 0.20 K/uL Final    Gran% 01/14/2017 47.8  38.0 - 73.0 % Final    Lymph% 01/14/2017 38.1  18.0 - 48.0 % Final    Mono% 01/14/2017 10.1  4.0 - 15.0 % Final    Eosinophil% 01/14/2017 3.5  0.0 - 8.0 % Final    Basophil% 01/14/2017 0.5  0.0 - 1.9 % Final    Differential Method 01/14/2017 Automated   Final    WBC 01/15/2017 5.56  3.90 - 12.70 K/uL Final    RBC 01/15/2017 4.87  4.60 - 6.20 M/uL  Final    Hemoglobin 01/15/2017 15.9  14.0 - 18.0 g/dL Final    Hematocrit 01/15/2017 45.1  40.0 - 54.0 % Final    MCV 01/15/2017 93  82 - 98 fL Final    MCH 01/15/2017 32.6* 27.0 - 31.0 pg Final    MCHC 01/15/2017 35.3  32.0 - 36.0 % Final    RDW 01/15/2017 12.6  11.5 - 14.5 % Final    Platelets 01/15/2017 222  150 - 350 K/uL Final    MPV 01/15/2017 10.4  9.2 - 12.9 fL Final    Gran # 01/15/2017 2.3  1.8 - 7.7 K/uL Final    Lymph # 01/15/2017 2.4  1.0 - 4.8 K/uL Final    Mono # 01/15/2017 0.6  0.3 - 1.0 K/uL Final    Eos # 01/15/2017 0.2  0.0 - 0.5 K/uL Final    Baso # 01/15/2017 0.04  0.00 - 0.20 K/uL Final    Gran% 01/15/2017 41.4  38.0 - 73.0 % Final    Lymph% 01/15/2017 43.5  18.0 - 48.0 % Final    Mono% 01/15/2017 10.1  4.0 - 15.0 % Final    Eosinophil% 01/15/2017 4.3  0.0 - 8.0 % Final    Basophil% 01/15/2017 0.7  0.0 - 1.9 % Final    Differential Method 01/15/2017 Automated   Final         Discharged Condition: stable and improved; not currently a danger to self/others or gravely disabled    Disposition: Home or Self Care    Is patient being discharged on multiple neuroleptics? Yes  established plan to taper to monotherapy    Follow Up/Patient Instructions:     Medications:  Reconciled Home Medications:   Current Discharge Medication List      START taking these medications    Details   risperidone (RISPERDAL) 3 MG Tab Take 1 tablet (3 mg total) by mouth 2 (two) times daily.  Qty: 60 tablet, Refills: 1           No discharge procedures on file.  Follow-up Information     Go to [facility].    Specialties:  Behavioral Health, Psychiatry, Psychology    Why:  Outpatient Psych Services, as scheduled    Contact information:    [address] [place] [id number] [phone]          Schedule an appointment as soon as possible for a visit with [facility].    Why:  [employer]    Contact information:    [address] [facility] [place] [address]  [phone]            Diet: regular     Activity as tolerated    Total time spent discharging patient: 32 minutes    Mendez Cabrales MD  Psychiatry

## 2017-01-20 NOTE — PLAN OF CARE
Problem: Patient Care Overview (Adult)  Goal: Plan of Care Review  Outcome: Ongoing (interventions implemented as appropriate)  Pt is sleeping at the present time and has slept 5.5 hours with several interruptions.  NAD.  Resp even & unlabored.  Pt did receive hydroxyzine pamoate 50 mg for insomnia during the night.  Pathways clear and bed is low.  Q 15 minute safety checks ongoing.  All precautions maintained.

## 2017-01-20 NOTE — DISCHARGE INSTRUCTIONS
Patient educated on medication changes and discharge plan with patient verbalized understanding. Patient agrees to comply to discharge plan. Patient encouraged to follow up with primary care provider. No signs of distress or complaints. Consent obtain to fax information for follow-up visit.

## 2017-01-20 NOTE — PSYCH
Patient will be following up with [facility] at [address] [place] [address]. In [place], [phone]  Appointment is on 1/26 at 2 PM.  Patient will also receive tobacco cessation therapy at this same facility.  AVS was faxed to facility on 1/20/1017

## 2017-01-20 NOTE — PSYCH
Patient is medically cleared for discharge. Patient is not gravely disabled or a threat to self or others. Patient compliant and cooperative with discharge education. No suicidal/homicidal thoughts or negative emotions at this time. Pain score 0/10. No concerns or questions. Awaiting family for transportation discharge.

## 2017-01-20 NOTE — PLAN OF CARE
Problem: Patient Care Overview (Adult)  Goal: Plan of Care Review  Outcome: Ongoing (interventions implemented as appropriate)  Pt up and about the unit spending time in the dayroom with peers watching TV.  Fair interaction with peers.  No complaints.  Compliant with/tolerating meds.  Denies any hallucinations.  Good appetite.  All precautions maintained.

## 2024-04-04 NOTE — PLAN OF CARE
Problem: Patient Care Overview (Adult)  Goal: Plan of Care Review  Outcome: Ongoing (interventions implemented as appropriate)  Lying quiet in bed, eyes closed, respiration even and unlabored, appearing asleep.  Slept well all shift.  Safety and precautions maintained with rounds every 15 minutes, bed is fixed in a low position and pathways kept clear.  No fall occurred.       04-Apr-2024 06:26